# Patient Record
Sex: FEMALE | Race: WHITE
[De-identification: names, ages, dates, MRNs, and addresses within clinical notes are randomized per-mention and may not be internally consistent; named-entity substitution may affect disease eponyms.]

---

## 2017-09-21 ENCOUNTER — HOSPITAL ENCOUNTER (INPATIENT)
Dept: HOSPITAL 25 - ED | Age: 77
LOS: 11 days | Discharge: NURSING, REHAB OR LONG-TERM CARE | DRG: 183 | End: 2017-10-02
Attending: HOSPITALIST | Admitting: INTERNAL MEDICINE
Payer: MEDICARE

## 2017-09-21 DIAGNOSIS — J90: ICD-10-CM

## 2017-09-21 DIAGNOSIS — M10.9: ICD-10-CM

## 2017-09-21 DIAGNOSIS — M19.012: ICD-10-CM
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
## 2017-09-21 DIAGNOSIS — N17.9: ICD-10-CM

## 2017-09-21 DIAGNOSIS — Z88.1: ICD-10-CM

## 2017-09-21 DIAGNOSIS — D17.79: ICD-10-CM

## 2017-09-21 DIAGNOSIS — J18.9: ICD-10-CM

## 2017-09-21 DIAGNOSIS — Z79.4: ICD-10-CM

## 2017-09-21 DIAGNOSIS — Z99.81: ICD-10-CM

## 2017-09-21 DIAGNOSIS — J44.9: ICD-10-CM

## 2017-09-21 DIAGNOSIS — E66.01: ICD-10-CM

## 2017-09-21 DIAGNOSIS — S22.42XA: Primary | ICD-10-CM

## 2017-09-21 DIAGNOSIS — Z81.8: ICD-10-CM
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
## 2017-09-21 DIAGNOSIS — Z88.0: ICD-10-CM

## 2017-09-21 DIAGNOSIS — I12.9: ICD-10-CM

## 2017-09-21 DIAGNOSIS — F03.90: ICD-10-CM

## 2017-09-21 DIAGNOSIS — Z79.899: ICD-10-CM

## 2017-09-21 DIAGNOSIS — F05: ICD-10-CM

## 2017-09-21 DIAGNOSIS — Z91.048: ICD-10-CM

## 2017-09-21 DIAGNOSIS — E11.22: ICD-10-CM

## 2017-09-21 DIAGNOSIS — Z83.3: ICD-10-CM

## 2017-09-21 DIAGNOSIS — Z82.49: ICD-10-CM

## 2017-09-21 DIAGNOSIS — Z88.8: ICD-10-CM

## 2017-09-21 DIAGNOSIS — Z79.84: ICD-10-CM

## 2017-09-21 DIAGNOSIS — N18.3: ICD-10-CM

## 2017-09-21 DIAGNOSIS — E78.5: ICD-10-CM

## 2017-09-21 DIAGNOSIS — Z87.891: ICD-10-CM

## 2017-09-21 DIAGNOSIS — E87.5: ICD-10-CM

## 2017-09-21 LAB
ALBUMIN SERPL BCG-MCNC: 3.9 G/DL (ref 3.2–5.2)
ALP SERPL-CCNC: 72 U/L (ref 34–104)
ALT SERPL W P-5'-P-CCNC: 13 U/L (ref 7–52)
ANION GAP SERPL CALC-SCNC: 8 MMOL/L (ref 2–11)
AST SERPL-CCNC: 24 U/L (ref 13–39)
BUN SERPL-MCNC: 39 MG/DL (ref 6–24)
BUN/CREAT SERPL: 20.6 (ref 8–20)
CALCIUM SERPL-MCNC: 9.6 MG/DL (ref 8.6–10.3)
CHLORIDE SERPL-SCNC: 109 MMOL/L (ref 101–111)
GLOBULIN SER CALC-MCNC: 3.3 G/DL (ref 2–4)
GLUCOSE SERPL-MCNC: 164 MG/DL (ref 70–100)
HCO3 SERPL-SCNC: 20 MMOL/L (ref 22–32)
HCT VFR BLD AUTO: 36 % (ref 35–47)
HGB BLD-MCNC: 11.6 G/DL (ref 12–16)
MCH RBC QN AUTO: 33 PG (ref 27–31)
MCHC RBC AUTO-ENTMCNC: 32 G/DL (ref 31–36)
MCV RBC AUTO: 102 FL (ref 80–97)
POTASSIUM SERPL-SCNC: 5.2 MMOL/L (ref 3.5–5)
PROT SERPL-MCNC: 7.2 G/DL (ref 6.4–8.9)
RBC # BLD AUTO: 3.56 10^6/UL (ref 4–5.4)
SODIUM SERPL-SCNC: 137 MMOL/L (ref 133–145)
WBC # BLD AUTO: 11.8 10^3/UL (ref 3.5–10.8)

## 2017-09-21 PROCEDURE — 81003 URINALYSIS AUTO W/O SCOPE: CPT

## 2017-09-21 PROCEDURE — 71010: CPT

## 2017-09-21 PROCEDURE — 82570 ASSAY OF URINE CREATININE: CPT

## 2017-09-21 PROCEDURE — 74150 CT ABDOMEN W/O CONTRAST: CPT

## 2017-09-21 PROCEDURE — 80048 BASIC METABOLIC PNL TOTAL CA: CPT

## 2017-09-21 PROCEDURE — 85730 THROMBOPLASTIN TIME PARTIAL: CPT

## 2017-09-21 PROCEDURE — 80053 COMPREHEN METABOLIC PANEL: CPT

## 2017-09-21 PROCEDURE — 36415 COLL VENOUS BLD VENIPUNCTURE: CPT

## 2017-09-21 PROCEDURE — 84145 PROCALCITONIN (PCT): CPT

## 2017-09-21 PROCEDURE — 82803 BLOOD GASES ANY COMBINATION: CPT

## 2017-09-21 PROCEDURE — 93005 ELECTROCARDIOGRAM TRACING: CPT

## 2017-09-21 PROCEDURE — 84100 ASSAY OF PHOSPHORUS: CPT

## 2017-09-21 PROCEDURE — 81015 MICROSCOPIC EXAM OF URINE: CPT

## 2017-09-21 PROCEDURE — 85610 PROTHROMBIN TIME: CPT

## 2017-09-21 PROCEDURE — 85027 COMPLETE CBC AUTOMATED: CPT

## 2017-09-21 PROCEDURE — 85025 COMPLETE CBC W/AUTO DIFF WBC: CPT

## 2017-09-21 PROCEDURE — 71250 CT THORAX DX C-: CPT

## 2017-09-21 PROCEDURE — 84300 ASSAY OF URINE SODIUM: CPT

## 2017-09-21 PROCEDURE — 36600 WITHDRAWAL OF ARTERIAL BLOOD: CPT

## 2017-09-21 NOTE — RAD
INDICATION: Bruising and pain overlying the left flank after a fall from a standing height



COMPARISON: None.



TECHNIQUE: Multidetector CT images of the chest and abdomen were obtained from the lung

apices to the pelvic inlet without intravenous contrast   .



CHEST: 



The lungs exhibit mild bilateral groundglass opacification as well as centrilobular

emphysematous changes. There is no pneumothorax or large pleural effusion.



There is no mediastinal or hilar lymphadenopathy.



The heart and major vascular structures are grossly normal in appearance. There is coarse

atherosclerotic calcification of the coronary arteries and arch of the aorta.



There is nondisplaced fracture at the posterior left 12th rib (image 63), the posterior

left eighth rib (image 34), the posterior left seventh rib (image 27), the posterior

lateral left sixth rib (image 24) and the posterior lateral left fifth rib (image 19). The

sixth rib fracture exhibits the greatest degree of displacement.



Depicted best on the sagittal plane images (61 through 63) there is cortical irregularity

at the tip of the scapula but the margins seem adequately corticated giving it more of a

chronic appearance. There is no focal inflammatory change surrounding this appearance.



ABDOMEN \T\ PELVIS: 



The liver, spleen, pancreas and left adrenal gland are grossly normal in appearance. The

gallbladder is normal.



At the medial limb of the left adrenal gland (image 52) there is an 11 mm fat density

nodule.



The kidneys are normal in appearance without focal mass, calcification or signs of

hydronephrosis.



The visualized portions of the small and large bowel are not distended. Distal colonic

diverticula are noted, , none with focal inflammatory change.



There is coarse atherosclerotic calcification of the abdominal aorta extending to the

iliac bifurcation.



Multifocal degenerative changes of the thoracic and upper lumbar spine include loss of

intervertebral disc height and marginal osteophyte formation and lower thoracic and lumbar

level vacuum disc phenomenon. No definite vertebral body fracture is identified.



IMPRESSION: 



1. Multiple posterior lateral left rib fractures as described above with only the 6th rib

fracture exhibiting any significant displacement. There is no left-sided pneumothorax or

large pleural effusion. 

2. Cortical irregularity at the tip of the left scapula may been a fracture as well, but

the margins indicate a more chronic process.

3. Likely left adrenal lipoma measuring 11 mm in greatest dimension. There are no prior CT

images to comment on chronicity. This finding can either be followed up or further

characterized with multiphase CT or MRI of the abdomen according to an adrenal gland

protocol. Clinical significance is doubtful. 

4. Mild diffuse groundglass opacification and thickening of the interlobular septa could

be due to chronic congestive heart failure or early interstitial lung disease.

5. There are additional chronic and degenerative changes described in the body the report

unlikely to BE related to the patient's acute presentation.

## 2017-09-21 NOTE — RAD
HISTORY: Pain after fall



COMPARISONS: None



VIEWS: 6,  Frontal view of the chest with frontal and oblique views of the left hemithorax



FINDINGS: There is no displaced rib fracture or pneumothorax. The cardiac silhouette is

mildly enlarged. There is patchy linear opacification lung bases bilaterally.    



IMPRESSION: 

NO DISPLACED RIB FRACTURE OR PNEUMOTHORAX.

CLINICALLY.

BIBASILAR ATELECTASIS VERSUS CONSOLIDATION.

## 2017-09-21 NOTE — RAD
Indication: LEFT shoulder pain post fall.



Comparison: November 08, 2013 chest radiograph.



Technique: Internal rotation AP, external rotation Grashey, scapular Y, axillary views

LEFT shoulder



Report: Normal acromioclavicular and glenohumeral joint alignment. Negative for fracture.

Moderate osteophytosis and capsular hypertrophy at the acromial clavicular joint with

suggestion of small loose bodies at the AC joint versus extensive chondrocalcinosis and

dystrophic calcification of the joint capsule. Mild osteophytic lipping at the

glenohumeral joint and moderate joint space narrowing. Mild burden of calcific

tendinopathy at the supraspinatus and infraspinatus. Unremarkable soft tissue contours.



IMPRESSION: 

1. Advanced osteoarthritis at the acromioclavicular joint with progression.

2. Less marked osteoarthritis at the glenohumeral joint.

3. Stigmata of calcific tendinopathy.

4. Negative for fracture.

## 2017-09-22 LAB
ADD DIFF/SLIDE REVIEW?: (no result)
ANION GAP SERPL CALC-SCNC: 6 MMOL/L (ref 2–11)
BUN SERPL-MCNC: 40 MG/DL (ref 6–24)
BUN/CREAT SERPL: 21.9 (ref 8–20)
CALCIUM SERPL-MCNC: 9.1 MG/DL (ref 8.6–10.3)
CHLORIDE SERPL-SCNC: 111 MMOL/L (ref 101–111)
GLUCOSE SERPL-MCNC: 132 MG/DL (ref 70–100)
HCO3 SERPL-SCNC: 21 MMOL/L (ref 22–32)
HCT VFR BLD AUTO: 33 % (ref 35–47)
HGB BLD-MCNC: 10.7 G/DL (ref 12–16)
MCH RBC QN AUTO: 33 PG (ref 27–31)
MCHC RBC AUTO-ENTMCNC: 32 G/DL (ref 31–36)
MCV RBC AUTO: 102 FL (ref 80–97)
POTASSIUM SERPL-SCNC: 5.3 MMOL/L (ref 3.5–5)
RBC # BLD AUTO: 3.26 10^6/UL (ref 4–5.4)
SODIUM SERPL-SCNC: 138 MMOL/L (ref 133–145)
WBC # BLD AUTO: 11.3 10^3/UL (ref 3.5–10.8)

## 2017-09-22 RX ADMIN — HEPARIN SODIUM SCH UNITS: 5000 INJECTION INTRAVENOUS; SUBCUTANEOUS at 06:16

## 2017-09-22 RX ADMIN — HEPARIN SODIUM SCH UNITS: 5000 INJECTION INTRAVENOUS; SUBCUTANEOUS at 13:25

## 2017-09-22 RX ADMIN — MORPHINE SULFATE PRN MG: 10 INJECTION, SOLUTION INTRAMUSCULAR; INTRAVENOUS at 17:39

## 2017-09-22 RX ADMIN — HEPARIN SODIUM SCH UNITS: 5000 INJECTION INTRAVENOUS; SUBCUTANEOUS at 21:44

## 2017-09-22 RX ADMIN — INSULIN LISPRO SCH: 100 INJECTION, SOLUTION INTRAVENOUS; SUBCUTANEOUS at 16:53

## 2017-09-22 RX ADMIN — INSULIN LISPRO SCH UNITS: 100 INJECTION, SOLUTION INTRAVENOUS; SUBCUTANEOUS at 08:22

## 2017-09-22 RX ADMIN — DOCUSATE SODIUM SCH MG: 100 CAPSULE, LIQUID FILLED ORAL at 08:25

## 2017-09-22 RX ADMIN — SENNOSIDES SCH TAB: 8.6 TABLET, FILM COATED ORAL at 08:25

## 2017-09-22 RX ADMIN — ATENOLOL SCH MG: 50 TABLET ORAL at 08:25

## 2017-09-22 RX ADMIN — MORPHINE SULFATE PRN MG: 10 INJECTION, SOLUTION INTRAMUSCULAR; INTRAVENOUS at 21:45

## 2017-09-22 RX ADMIN — INSULIN LISPRO SCH: 100 INJECTION, SOLUTION INTRAVENOUS; SUBCUTANEOUS at 11:42

## 2017-09-22 RX ADMIN — GLIPIZIDE SCH MG: 5 TABLET ORAL at 21:44

## 2017-09-22 RX ADMIN — OXYCODONE HYDROCHLORIDE PRN MG: 5 CAPSULE ORAL at 02:28

## 2017-09-22 RX ADMIN — GABAPENTIN SCH MG: 300 CAPSULE ORAL at 08:24

## 2017-09-22 RX ADMIN — LIDOCAINE SCH PATCH: 50 PATCH CUTANEOUS at 02:25

## 2017-09-22 RX ADMIN — LEVOTHYROXINE SODIUM SCH MCG: 75 TABLET ORAL at 06:12

## 2017-09-22 RX ADMIN — SENNOSIDES SCH TAB: 8.6 TABLET, FILM COATED ORAL at 21:44

## 2017-09-22 RX ADMIN — AMLODIPINE BESYLATE SCH MG: 5 TABLET ORAL at 08:24

## 2017-09-22 RX ADMIN — GABAPENTIN SCH MG: 300 CAPSULE ORAL at 13:24

## 2017-09-22 RX ADMIN — GABAPENTIN SCH MG: 300 CAPSULE ORAL at 21:44

## 2017-09-22 RX ADMIN — INSULIN LISPRO SCH: 100 INJECTION, SOLUTION INTRAVENOUS; SUBCUTANEOUS at 21:15

## 2017-09-22 RX ADMIN — INSULIN GLARGINE SCH UNITS: 100 INJECTION, SOLUTION SUBCUTANEOUS at 08:23

## 2017-09-22 RX ADMIN — DOCUSATE SODIUM SCH MG: 100 CAPSULE, LIQUID FILLED ORAL at 21:43

## 2017-09-22 RX ADMIN — GLIPIZIDE SCH MG: 5 TABLET ORAL at 08:25

## 2017-09-22 RX ADMIN — WATER SCH NOTE: 100 INJECTION, SOLUTION INTRAVENOUS at 13:30

## 2017-09-22 NOTE — PN
Subjective


Date of Service: 09/22/17


Interval History: 





Patient seen this morning. Reports continued rib pain with movement and very 

deep breathing, not too bothersome when at rest. Reports that current dose of 

morphine does not touch the pain.


Family History: Unchanged from Admission


Social History: Unchanged from Admission


Past Medical History: Unchanged from Admission





Objective


Active Medications: 








Acetaminophen (Tylenol Tab*)  650 mg PO Q4H PRN


Amlodipine Besylate (Norvasc Tab*)  2.5 mg PO DAILY [facility]


Atenolol (Tenormin Tab*)  50 mg PO DAILY [facility]


Dextrose (D50w Syringe 50 Ml*)  12.5 gm IV PUSH .FOR FS < 60 - SS PRN


Docusate Sodium (Colace Cap*)  100 mg PO BID [facility]


Gabapentin (Neurontin Cap(*))  600 mg PO TID [facility]


Glipizide (Glucotrol Tab*)  10 mg PO BID [facility]


Heparin Sodium (Porcine) (Heparin Vial(*))  5,000 units SUBCUT Q8HR [facility]


Insulin Glargine (Lantus(*))  10 units SUBCUT Q24H [facility]


Insulin Human Lispro (Humalog*)  0 units SUBCUT ACHS [facility]


Levothyroxine Sodium (Synthroid Tab*)  75 mcg PO 0600 [facility]


Lidocaine (Lidoderm 5% Patch*)  1 patch TRANSDERM 0900 [facility]


Morphine Sulfate (Morphine Inj (Syringe)*)  6 mg IV Q4H PRN


Oxycodone HCl (Roxycodone Tab*)  10 mg PO Q4H PRN


Pharmacy Profile Note (Lidocaine Patch Remove*)  1 note PATCH OFF 2100 [facility]


Senna (Senokot Tab*)  1 tab PO BID [facility]





 Vital Signs











  09/22/17 09/22/17 09/22/17





  00:00 00:01 00:30


 


Temperature   


 


Pulse Rate 56 56 50


 


Respiratory   





Rate   


 


Blood Pressure  158/60 80/58





(mmHg)   


 


O2 Sat by Pulse 87 88 94





Oximetry   














  09/22/17 09/22/17 09/22/17





  00:40 00:43 00:50


 


Temperature 97.9 F  


 


Pulse Rate 58 55 


 


Respiratory 16  18





Rate   


 


Blood Pressure 154/59 154/59 





(mmHg)   


 


O2 Sat by Pulse 96 92 





Oximetry   














  09/22/17 09/22/17 09/22/17





  02:00 02:28 03:47


 


Temperature 97.7 F  97.8 F


 


Pulse Rate 57  51


 


Respiratory 18 22 20





Rate   


 


Blood Pressure 161/40  136/41





(mmHg)   


 


O2 Sat by Pulse 97  95





Oximetry   











Oxygen Devices in Use Now: Nasal Cannula


Appearance: Elderly, obese,  F, laying in bed in NAD


Eyes: No Scleral Icterus


Ears/Nose/Mouth/Throat: Mucous Membranes Moist


Neck: NL Appearance and Movements; NL JVP


Respiratory: Symmetrical Chest Expansion and Respiratory Effort, - - Diminished 

in bases


Cardiovascular: NL Sounds; No Murmurs; No JVD, RRR


Abdominal: NL Sounds; No Tenderness; No Distention


Lymphatic: No Cervical Adenopathy


Extremities: No Edema


Skin: - - Ecchymosis along L later chest wall and back


Neurological: Alert and Oriented x 3


Result Diagrams: 


 09/22/17 05:54





 09/22/17 05:54





Assess/Plan/Problems-Billing


Assessment: 


Mechanical fall and subsequent rib fxs in a 77 yo F with hx of HTN, HLD, obesity

, O2 dependent COPD, DM








- Patient Problems


(1) Rib fractures


Current Visit: Yes   Comment: Pain control. Will increase prn morphine. 

Incentive spirometer. PT/OT   





(2) Diabetes


Current Visit: Yes   Comment: Continue HISS and Lantus. Holding home metformin. 

Continue Glipizide.    





(3) HTN (hypertension)


Current Visit: Yes   Comment: Continue Atenolol. Holding home Lisinopril.    





(4) O2 dependent


Current Visit: Yes   Comment: Patient denies COPD but is on home O2. Will need 

med rec.   





(5) DVT prophylaxis


Current Visit: Yes   Comment: HSQ   


Status and Disposition: 





Inpatient for continued pain control. ?need for MARILY

## 2017-09-22 NOTE — HP
CC:  Dr. Motta *

 

HISTORY AND PHYSICAL:

 

DATE OF ADMISSION:  09/21/17

 

PRIMARY CARE PHYSICIAN:  Dr. Motta

 

CHIEF COMPLAINT:  Status post fall and left chest pain.

 

HISTORY OF PRESENT ILLNESS:  The patient is a 76-year-old female with history 
of diabetes, hypertension, oxygen-dependent COPD who was picking up her pants 
today while in the bathroom and she tripped when she did that, fell and her 
left upper chest and left upper back hit the toilet seat.  She had extreme pain 
afterwards and she came into ED for evaluation.  Here a CT of the chest, 
abdomen and pelvis showed 5 broken ribs on the left side.  She is going to be 
admitted for intractable pain and left-sided rib fractures.

 

PAST MEDICAL HISTORY:

1.  History of degenerative disk disease.

2.  History of diabetes type 2.

3.  Hypertension.

4.  Hyperlipidemia.

5.  Obesity.

6.  COPD, oxygen dependent.

7.  History of gout.

8.  History of right leg ORIF.

9.  Chronic kidney disease stage 3 due to diabetes.

 

MEDICATIONS:  Include:

1.  Ultram 50 mg 3 times a day as needed.

2.  Lantus insulin 42 units subcutaneously b.i.d.

3.  Simvastatin 20 mg a day.

4.  Neurontin 600 mg 3 times a day.

5.  Levothyroxine 75 mcg daily.

6.  Glipizide 10 mg b.i.d.

7.  Potassium chloride 10 mEq daily.

8.  Metformin 1000 mg b.i.d.

9.  Atenolol with chlorthalidone 50/25 one tablet a day.

10.  Amlodipine 2.5 mg daily.

 

ALLERGIES:  Multiple and include CEPHALEXIN, CI PIGMENT BLUE 63 from CYMBALTA, 
DULOXETINE, PENICILLINS, BACTRIM, and GANTRISIN.

 

FAMILY HISTORY:  Positive for mother with history of dementia and father with 
history of diabetes, heart disease, and polio.

 

SOCIAL HISTORY:  The patient had quit smoking in 1999.  She denies any alcohol 
or drug use.  She lives with her daughter who is a nurse and her .  As a 
surrogate, she mentions both her  and her daughter.  Her 's name 
is Stew Muir and daughter is Yue Hdez.

 

REVIEW OF SYSTEMS:  Please see history of present illness.  Patient had been in 
her usual state of health until the fall.  After her fall, she noted that 
severe left chest and left upper back pain.  She denies losing consciousness 
with the fall. She stated that it was purely mechanical.  She also denies 
losing consciousness after the fall.

 

All the remaining 14 systems were reviewed with the patient and were otherwise 
negative.

 

                               PHYSICAL EXAMINATION

 

GENERAL:  The patient is a pleasant 76-year-old female, whose BMI is 43.  The 
patient is in no acute distress.  Alert, awake, and oriented x3.

 

VITAL SIGNS:  Blood pressure of 141/96, heart rate of 56 and regular, 
respiratory rate 16, and oxygen saturation 90% on 2 L of oxygen nasal cannula, 
temperature of 98.2.

 

HEENT:  Head atraumatic and normocephalic.  Eyes:  Pupils are equal and 
reactive to light and accommodation.  Oropharynx clear, mucosa moist.

 

NECK:  Supple.  No JVD.  No bruits bilaterally.

 

RESPIRATORY:  Coarse crackles at bilateral bases, otherwise clear.

 

CARDIOVASCULAR:  Regular rate and rhythm.  No murmur.

 

ABDOMEN:  Soft and nontender.  Bowel sounds are present in all 4 quadrants.

 

EXTREMITIES:  There is a trace bilateral ankle edema.  Pulses +2 bilaterally.  
No clubbing or cyanosis.

 

NEUROLOGIC:  Speech clear.  Cranial nerves II through XII are grossly intact. 
Motor strength is 5/5 bilaterally.

 

PSYCHIATRIC:  The patient is pleasant, cooperative with evaluation with no 
evidence of anxiety or depression.

 

SKIN:  The patient has a rather large area of ecchymosis overlying the left 
chest and left upper back.

 

 DIAGNOSTIC STUDIES/LAB DATA:  Sodium of 137, potassium of 5.2, chloride 109, 
carbon dioxide 20, BUN 39, creatinine 1.89. Liver function tests unremarkable.

 

White blood cell count of 11.8, hemoglobin of 11.6, hematocrit of 36, and MCV 
of 102, and platelets of 212.

 

CT of chest and abdomen - impression: "Multiple posterolateral rib fractures as 
described above with only the 6th rib fracture exhibiting any significant 
displacement. There is no left-sided pneumothorax or pleural effusion.  
Cortical irregularity at the tip of the left scapula may be a fracture as well, 
but the margins indicate a more chronic process.  Likely left adrenal lipoma 
measuring 11 mm in the greatest dimension.  There are no prior CT images to 
comment on chronicity.  This finding is either to be followed up with further 
images of CT or MRI of the abdomen according to adrenal gland protocol.  
Clinical significance is doubtful.  Mild diffuse ground-glass opacification and 
thickening of the interlobular septa could be due to chronic congestive heart 
failure or an interstitial lung disease.  There are additional chronic and 
degenerative changes as described in the body of the report, likely to be 
related to the patient's acute presentation."

 

Shoulder x-ray on the left - impression:  "Advanced osteoarthritis of 
acromioclavicular joint with progression.  Less marked osteoarthritis of the 
glenohumeral joint.  Stigmata of calcific tendinopathy.  Negative for fracture.
"
 

ASSESSMENT AND PLAN:

1.  Patient has a chest trauma after a fall.  She suffered from 5 rib fractures
, out of them only one is displaced.  So far she is going to be admitted for 
pain control.  There is no noted pneumothorax and no other trauma.  Dilaudid 
made her "woozy and weak."  I am going to continue the patient on morphine and 
oxycodone. She may require a nerve block in the future if she continues to have 
problems with pain control.

2.  In regards to the patient's diabetes, her glipizide is going to be 
continued. Metformin is going to be held.  The patient is going to be placed on 
10 units of Lantus and insulin sliding scale.

3.  In regards to the patient's hypertension, I will continue atenolol for the 
time being and observe the patient's blood pressure.

4.  Patient's hyperkalemia is mild.  I will hold the patient's lisinopril for 
the time being.

5.  In regards to patient's chronic obstructive pulmonary disease, it is not an 
exacerbation.  She is going to be continued on Advair and oxygen 
supplementation.

6.  For DVT prophylaxis.  The patient is going to be placed on heparin 
subcutaneously.

7.  Due to her recent fall, Physical Therapy and Occupational Therapy will see 
patient in evaluation.

8.  The patient's code status is full.

 

TIME SPENT:  Please note that approximately 62 minutes was spent on the patient'
s admission, more than half that time was spent face-to-face with the patient 
during the interview and physical exam.

 

[id number]/CPS #: [id number]

JOSLYN

## 2017-09-22 NOTE — ED
I, Krishan Swartz, scribed for Rohan Charles MD on 09/21/17 at 2322 .





Progress





- Progress Note


Progress Note: 





77yo female is here for left rib pain s/p mechanical fall. Signout pt from Dr. Fry. Pending CT report.





- Results/Orders


Results/Orders: 





CT Chest/Abdomen W/O





IMPRESSION: 





1. Multiple posterior lateral left rib fractures as described above with only 

the 6th rib


fracture exhibiting any significant displacement. There is no left-sided 

pneumothorax or


large pleural effusion. 


2. Cortical irregularity at the tip of the left scapula may been a fracture as 

well, but


the margins indicate a more chronic process.


3. Likely left adrenal lipoma measuring 11 mm in greatest dimension. There are 

no prior CT


images to comment on chronicity. This finding can either be followed up or 

further


characterized with multiphase CT or MRI of the abdomen according to an adrenal 

gland


protocol. Clinical significance is doubtful. 


4. Mild diffuse groundglass opacification and thickening of the interlobular 

septa could


be due to chronic congestive heart failure or early interstitial lung disease.


5. There are additional chronic and degenerative changes described in the body 

the report


unlikely to BE related to the patient's acute presentation.





ED physician has reviewed the radiology report and agrees with the finding.





Re-Evaluation





- Re-Evaluation


  ** First Eval


Re-Evaluation Time: 23:09


Comment: Reviewed CT report with the pt.





Course/Dx





- Course


Course Of Treatment: Discussed with Dr. Leal (hospitalist) at 2313 hour. Dr. Leal will evaluate the pt.





- Diagnoses


Provider Diagnoses: 


 Left rib fracture, possible scapular fracture








The documentation as recorded by the Maxime lewis Benjamin accurately reflects 

the service I personally performed and the decisions made by me, Rohan Charles MD.

## 2017-09-23 LAB
ANION GAP SERPL CALC-SCNC: 7 MMOL/L (ref 2–11)
ANION GAP SERPL CALC-SCNC: 8 MMOL/L (ref 2–11)
BUN SERPL-MCNC: 51 MG/DL (ref 6–24)
BUN SERPL-MCNC: 53 MG/DL (ref 6–24)
BUN/CREAT SERPL: 18.2 (ref 8–20)
BUN/CREAT SERPL: 18.7 (ref 8–20)
CALCIUM SERPL-MCNC: 9 MG/DL (ref 8.6–10.3)
CALCIUM SERPL-MCNC: 9 MG/DL (ref 8.6–10.3)
CHLORIDE SERPL-SCNC: 108 MMOL/L (ref 101–111)
CHLORIDE SERPL-SCNC: 109 MMOL/L (ref 101–111)
GLUCOSE SERPL-MCNC: 103 MG/DL (ref 70–100)
GLUCOSE SERPL-MCNC: 81 MG/DL (ref 70–100)
HCO3 SERPL-SCNC: 18 MMOL/L (ref 22–32)
HCO3 SERPL-SCNC: 20 MMOL/L (ref 22–32)
O2/TOTAL GAS SETTING VFR VENT: 2 %
POTASSIUM SERPL-SCNC: 5.8 MMOL/L (ref 3.5–5)
POTASSIUM SERPL-SCNC: 6.1 MMOL/L (ref 3.5–5)
SODIUM SERPL-SCNC: 135 MMOL/L (ref 133–145)
SODIUM SERPL-SCNC: 135 MMOL/L (ref 133–145)

## 2017-09-23 RX ADMIN — GLIPIZIDE SCH MG: 5 TABLET ORAL at 09:32

## 2017-09-23 RX ADMIN — INSULIN LISPRO SCH: 100 INJECTION, SOLUTION INTRAVENOUS; SUBCUTANEOUS at 08:43

## 2017-09-23 RX ADMIN — HEPARIN SODIUM SCH UNITS: 5000 INJECTION INTRAVENOUS; SUBCUTANEOUS at 21:39

## 2017-09-23 RX ADMIN — OXYCODONE HYDROCHLORIDE PRN MG: 5 CAPSULE ORAL at 09:33

## 2017-09-23 RX ADMIN — ATENOLOL SCH MG: 50 TABLET ORAL at 09:31

## 2017-09-23 RX ADMIN — ACETAMINOPHEN SCH MG: 325 TABLET ORAL at 13:53

## 2017-09-23 RX ADMIN — HEPARIN SODIUM SCH UNITS: 5000 INJECTION INTRAVENOUS; SUBCUTANEOUS at 05:52

## 2017-09-23 RX ADMIN — OXYCODONE HYDROCHLORIDE PRN MG: 5 CAPSULE ORAL at 04:42

## 2017-09-23 RX ADMIN — ALLOPURINOL SCH MG: 100 TABLET ORAL at 09:32

## 2017-09-23 RX ADMIN — SENNOSIDES SCH TAB: 8.6 TABLET, FILM COATED ORAL at 09:32

## 2017-09-23 RX ADMIN — SENNOSIDES SCH TAB: 8.6 TABLET, FILM COATED ORAL at 21:40

## 2017-09-23 RX ADMIN — CALCITRIOL SCH MCG: 0.25 CAPSULE ORAL at 09:40

## 2017-09-23 RX ADMIN — ACETAMINOPHEN SCH MG: 325 TABLET ORAL at 21:39

## 2017-09-23 RX ADMIN — HEPARIN SODIUM SCH UNITS: 5000 INJECTION INTRAVENOUS; SUBCUTANEOUS at 13:53

## 2017-09-23 RX ADMIN — INSULIN LISPRO SCH: 100 INJECTION, SOLUTION INTRAVENOUS; SUBCUTANEOUS at 12:53

## 2017-09-23 RX ADMIN — ATORVASTATIN CALCIUM SCH MG: 10 TABLET, FILM COATED ORAL at 09:32

## 2017-09-23 RX ADMIN — MORPHINE SULFATE PRN MG: 10 INJECTION, SOLUTION INTRAMUSCULAR; INTRAVENOUS at 07:45

## 2017-09-23 RX ADMIN — INSULIN LISPRO SCH: 100 INJECTION, SOLUTION INTRAVENOUS; SUBCUTANEOUS at 22:19

## 2017-09-23 RX ADMIN — DOCUSATE SODIUM SCH MG: 100 CAPSULE, LIQUID FILLED ORAL at 21:40

## 2017-09-23 RX ADMIN — WATER SCH NOTE: 100 INJECTION, SOLUTION INTRAVENOUS at 22:20

## 2017-09-23 RX ADMIN — DOCUSATE SODIUM SCH MG: 100 CAPSULE, LIQUID FILLED ORAL at 09:33

## 2017-09-23 RX ADMIN — INSULIN GLARGINE SCH UNITS: 100 INJECTION, SOLUTION SUBCUTANEOUS at 09:31

## 2017-09-23 RX ADMIN — AMLODIPINE BESYLATE SCH MG: 5 TABLET ORAL at 09:33

## 2017-09-23 RX ADMIN — LEVOTHYROXINE SODIUM SCH MCG: 75 TABLET ORAL at 05:52

## 2017-09-23 RX ADMIN — LIDOCAINE SCH PATCH: 50 PATCH CUTANEOUS at 09:23

## 2017-09-23 RX ADMIN — INSULIN LISPRO SCH: 100 INJECTION, SOLUTION INTRAVENOUS; SUBCUTANEOUS at 18:23

## 2017-09-23 RX ADMIN — GABAPENTIN SCH MG: 300 CAPSULE ORAL at 09:32

## 2017-09-23 NOTE — PN
Hospitalist Progress Note





Patient seen multiple times throughout the day with daughter present. In the 

afternoon she became less lethargic although remains somewhat confused and 

delirious. Suspect this is due to renal failure and poor clearance of morphine 

and gabapentin. ABG did not show respiratory acidosis, held on giving narcan as 

patient became more alert. For now will hold narcotics, if she becomes more 

coherent and has significant pain, can consider restarting her home Tramadol to 

begin with.


K remained elevated on PM check, gave Kayexalate.

## 2017-09-23 NOTE — PN
Subjective


Date of Service: 09/23/17


Interval History: 





Patient seen this morning. Nursing reported she had little pain control 

overnight and she received dose of morphine within the past two hours. On exam 

patient is very lethargic, will awaken to questions but quickly fall back 

asleep. While awake says she is in 10/10 pain but then dozes off again. 


Family History: Unchanged from Admission


Social History: Unchanged from Admission


Past Medical History: Unchanged from Admission





Objective


Active Medications: 








Acetaminophen (Tylenol Tab*)  975 mg PO TID [facility]


Allopurinol (Zyloprim Tab*)  100 mg PO DAILY [facility]


Amlodipine Besylate (Norvasc Tab*)  2.5 mg PO DAILY [facility]


   Last Admin: 09/22/17 08:24 Dose:  2.5 mg


Atenolol (Tenormin Tab*)  50 mg PO DAILY [facility]


   Last Admin: 09/22/17 08:25 Dose:  50 mg


Atorvastatin Calcium (Lipitor*)  10 mg PO DAILY [facility]


Calcitriol (Rocaltrol  Cap*)  0.25 mcg PO DAILY [facility]


Dextrose (D50w Syringe 50 Ml*)  12.5 gm IV PUSH .FOR FS < 60 - SS PRN


   PRN Reason: FS < 60


Docusate Sodium (Colace Cap*)  100 mg PO BID [facility]


   Last Admin: 09/22/17 21:43 Dose:  100 mg


Gabapentin (Neurontin Cap(*))  600 mg PO TID [facility]


   Last Admin: 09/22/17 21:44 Dose:  600 mg


Glipizide (Glucotrol Tab*)  10 mg PO BID [facility]


   Last Admin: 09/22/17 21:44 Dose:  10 mg


Heparin Sodium (Porcine) (Heparin Vial(*))  5,000 units SUBCUT Q8HR [facility]


   Last Admin: 09/23/17 05:52 Dose:  5,000 units


Insulin Glargine (Lantus(*))  10 units SUBCUT Q24H [facility]


   Last Admin: 09/22/17 08:23 Dose:  10 units


Insulin Human Lispro (Humalog*)  0 units SUBCUT ACHS [facility]


   PRN Reason: Protocol


   Last Admin: 09/23/17 08:43 Dose:  Not Given


Levothyroxine Sodium (Synthroid Tab*)  75 mcg PO 0600 [facility]


   Last Admin: 09/23/17 05:52 Dose:  75 mcg


Lidocaine (Lidoderm 5% Patch*)  1 patch TRANSDERM 0900 [facility]


   Last Admin: 09/22/17 02:25 Dose:  1 patch


Morphine Sulfate (Morphine Inj (Syringe)*)  6 mg IV Q4H PRN


   PRN Reason: PAIN


   Last Admin: 09/23/17 07:45 Dose:  6 mg


Oxycodone HCl (Roxycodone Tab*)  10 mg PO Q4H PRN


   PRN Reason: PAIN


   Last Admin: 09/23/17 04:42 Dose:  10 mg


Pharmacy Profile Note (Lidocaine Patch Remove*)  1 note PATCH OFF 2100 [facility]


   Last Admin: 09/22/17 13:30 Dose:  1 note


Senna (Senokot Tab*)  1 tab PO BID [facility]


   Last Admin: 09/22/17 21:44 Dose:  1 tab








 Vital Signs











  09/22/17 09/22/17 09/22/17





  09:26 09:53 10:24


 


Temperature  97.4 F 


 


Pulse Rate  46 


 


Respiratory 20 20 18





Rate   


 


Blood Pressure  145/51 





(mmHg)   


 


O2 Sat by Pulse  97 





Oximetry   














  09/22/17 09/22/17 09/22/17





  11:46 13:24 15:24


 


Temperature 97.6 F  


 


Pulse Rate 46  


 


Respiratory 16 18 18





Rate   


 


Blood Pressure 133/76  





(mmHg)   


 


O2 Sat by Pulse 92  





Oximetry   














  09/23/17 09/23/17





  06:51 07:45


 


Temperature 98.0 F 


 


Pulse Rate 47 


 


Respiratory 28 20





Rate  


 


Blood Pressure 151/46 





(mmHg)  


 


O2 Sat by Pulse 90 





Oximetry  











Oxygen Devices in Use Now: Nasal Cannula - 3L


Appearance: Elderly,  F, laying in bed, lethargic


Eyes: No Scleral Icterus


Ears/Nose/Mouth/Throat: - - Dry MM


Neck: NL Appearance and Movements; NL JVP


Respiratory: Symmetrical Chest Expansion and Respiratory Effort, - - Diminished 

in bases


Cardiovascular: NL Sounds; No Murmurs; No JVD, RRR


Abdominal: NL Sounds; No Tenderness; No Distention


Lymphatic: No Cervical Adenopathy


Extremities: No Edema


Skin: No Rash or Ulcers


Neurological: - - Lethargic, oriented, no focal deficits


Result Diagrams: 


 09/22/17 05:54





 09/23/17 14:09





Assess/Plan/Problems-Billing


Assessment: 


Mechanical fall and subsequent rib fxs in a 77 yo F with hx of HTN, HLD, obesity

, O2 dependent COPD, DM








- Patient Problems


(1) Rib fractures


Current Visit: Yes   Comment: Pain control. Will hold on any increases for now 

and monitor mental status prior to additional narcotics. Stop gabapentin. Will 

add ATC tylenol. Continue Lidoderm patch. Incentive spirometer. PT/OT   





(2) Diabetes


Current Visit: Yes   Comment: Continue HISS and Lantus. Holding home metformin. 

Continue Glipizide.    





(3) MARISSA (acute kidney injury)


Current Visit: Yes   Comment: on CKD. Creatinine bumped today, ?pre-renal, poor 

PO intake. Will give 1L IVF. Check ulytes, UA. Stop gabapentin.   





(4) HTN (hypertension)


Current Visit: Yes   Comment: Continue Atenolol. Holding home Lisinopril.    





(5) O2 dependent


Current Visit: Yes   Comment: Patient denies COPD but is on home O2.   





(6) DVT prophylaxis


Current Visit: Yes   Comment: HSQ   


Status and Disposition: 





Inpatient for continued pain control. ?need for MARILY

## 2017-09-24 LAB
ANION GAP SERPL CALC-SCNC: 7 MMOL/L (ref 2–11)
ANION GAP SERPL CALC-SCNC: 8 MMOL/L (ref 2–11)
BUN SERPL-MCNC: 61 MG/DL (ref 6–24)
BUN SERPL-MCNC: 64 MG/DL (ref 6–24)
BUN/CREAT SERPL: 18.3 (ref 8–20)
BUN/CREAT SERPL: 19.8 (ref 8–20)
CALCIUM SERPL-MCNC: 8.6 MG/DL (ref 8.6–10.3)
CALCIUM SERPL-MCNC: 8.7 MG/DL (ref 8.6–10.3)
CHLORIDE SERPL-SCNC: 108 MMOL/L (ref 101–111)
CHLORIDE SERPL-SCNC: 108 MMOL/L (ref 101–111)
GLUCOSE SERPL-MCNC: 53 MG/DL (ref 70–100)
GLUCOSE SERPL-MCNC: 65 MG/DL (ref 70–100)
HCO3 SERPL-SCNC: 19 MMOL/L (ref 22–32)
HCO3 SERPL-SCNC: 21 MMOL/L (ref 22–32)
HCT VFR BLD AUTO: 31 % (ref 35–47)
HGB BLD-MCNC: 9.9 G/DL (ref 12–16)
MCH RBC QN AUTO: 33 PG (ref 27–31)
MCHC RBC AUTO-ENTMCNC: 32 G/DL (ref 31–36)
MCV RBC AUTO: 103 FL (ref 80–97)
POTASSIUM SERPL-SCNC: 5.2 MMOL/L (ref 3.5–5)
POTASSIUM SERPL-SCNC: 5.3 MMOL/L (ref 3.5–5)
RBC # BLD AUTO: 2.99 10^6/UL (ref 4–5.4)
SODIUM SERPL-SCNC: 135 MMOL/L (ref 133–145)
SODIUM SERPL-SCNC: 136 MMOL/L (ref 133–145)
SODIUM UR-SCNC: 21 MMOL/L
WBC # BLD AUTO: 13.3 10^3/UL (ref 3.5–10.8)

## 2017-09-24 RX ADMIN — INSULIN LISPRO SCH: 100 INJECTION, SOLUTION INTRAVENOUS; SUBCUTANEOUS at 12:02

## 2017-09-24 RX ADMIN — ALLOPURINOL SCH MG: 100 TABLET ORAL at 09:50

## 2017-09-24 RX ADMIN — HEPARIN SODIUM SCH UNITS: 5000 INJECTION INTRAVENOUS; SUBCUTANEOUS at 05:40

## 2017-09-24 RX ADMIN — DOCUSATE SODIUM SCH MG: 100 CAPSULE, LIQUID FILLED ORAL at 20:51

## 2017-09-24 RX ADMIN — TRAMADOL HYDROCHLORIDE PRN MG: 50 TABLET, FILM COATED ORAL at 20:51

## 2017-09-24 RX ADMIN — HEPARIN SODIUM SCH UNITS: 5000 INJECTION INTRAVENOUS; SUBCUTANEOUS at 12:55

## 2017-09-24 RX ADMIN — ACETAMINOPHEN SCH MG: 325 TABLET ORAL at 20:51

## 2017-09-24 RX ADMIN — INSULIN LISPRO SCH: 100 INJECTION, SOLUTION INTRAVENOUS; SUBCUTANEOUS at 08:18

## 2017-09-24 RX ADMIN — DOCUSATE SODIUM SCH MG: 100 CAPSULE, LIQUID FILLED ORAL at 09:50

## 2017-09-24 RX ADMIN — TRAMADOL HYDROCHLORIDE PRN MG: 50 TABLET, FILM COATED ORAL at 11:27

## 2017-09-24 RX ADMIN — CALCITRIOL SCH MCG: 0.25 CAPSULE ORAL at 09:47

## 2017-09-24 RX ADMIN — SENNOSIDES SCH TAB: 8.6 TABLET, FILM COATED ORAL at 09:50

## 2017-09-24 RX ADMIN — WATER SCH NOTE: 100 INJECTION, SOLUTION INTRAVENOUS at 21:05

## 2017-09-24 RX ADMIN — HEPARIN SODIUM SCH UNITS: 5000 INJECTION INTRAVENOUS; SUBCUTANEOUS at 22:02

## 2017-09-24 RX ADMIN — ACETAMINOPHEN SCH MG: 325 TABLET ORAL at 09:45

## 2017-09-24 RX ADMIN — ACETAMINOPHEN SCH MG: 325 TABLET ORAL at 12:55

## 2017-09-24 RX ADMIN — INSULIN LISPRO SCH: 100 INJECTION, SOLUTION INTRAVENOUS; SUBCUTANEOUS at 16:50

## 2017-09-24 RX ADMIN — ATENOLOL SCH: 50 TABLET ORAL at 11:19

## 2017-09-24 RX ADMIN — LEVOTHYROXINE SODIUM SCH MCG: 75 TABLET ORAL at 05:40

## 2017-09-24 RX ADMIN — INSULIN LISPRO SCH: 100 INJECTION, SOLUTION INTRAVENOUS; SUBCUTANEOUS at 21:05

## 2017-09-24 RX ADMIN — SENNOSIDES SCH TAB: 8.6 TABLET, FILM COATED ORAL at 20:51

## 2017-09-24 RX ADMIN — LIDOCAINE SCH PATCH: 50 PATCH CUTANEOUS at 10:09

## 2017-09-24 RX ADMIN — AMLODIPINE BESYLATE SCH MG: 5 TABLET ORAL at 09:44

## 2017-09-24 RX ADMIN — ATORVASTATIN CALCIUM SCH MG: 10 TABLET, FILM COATED ORAL at 09:50

## 2017-09-24 NOTE — RAD
Indication: Hypoxia, shortness of breath.



Single frontal view of the chest performed at 0805 hours was reviewed.



Comparison is made with previous exam dated November 08, 2013.



Cardiomegaly with interstitial edema is noted. There may be pleural effusions noted.







IMPRESSION: CARDIOMEGALY WITH INTERSTITIAL EDEMA LIKELY PLEURAL EFFUSIONS.

## 2017-09-24 NOTE — PN
Subjective


Date of Service: 09/24/17


Interval History: 





Patient seen this morning. More alert and oriented. Recalls why she is here and 

what happened. Reports feeling better than yesterday, still with pain. Not 

taking much PO. Denies SOB, subjective f/c


Family History: Unchanged from Admission


Social History: Unchanged from Admission


Past Medical History: Unchanged from Admission





Objective


Active Medications: 








Acetaminophen (Tylenol Tab*)  975 mg PO TID [facility]


Allopurinol (Zyloprim Tab*)  100 mg PO DAILY [facility]


Amlodipine Besylate (Norvasc Tab*)  2.5 mg PO DAILY [facility]


Atenolol (Tenormin Tab*)  50 mg PO DAILY [facility]


Atorvastatin Calcium (Lipitor*)  10 mg PO DAILY [facility]


Calcitriol (Rocaltrol  Cap*)  0.25 mcg PO DAILY [facility]


Dextrose (D50w Syringe 50 Ml*)  12.5 gm IV PUSH .FOR FS < 60 - SS PRN


Docusate Sodium (Colace Cap*)  100 mg PO BID [facility]


Heparin Sodium (Porcine) (Heparin Vial(*))  5,000 units SUBCUT Q8HR [facility]


Insulin Glargine (Lantus(*))  10 units SUBCUT Q24H [facility]


Insulin Human Lispro (Humalog*)  0 units SUBCUT ACHS [facility]


Levothyroxine Sodium (Synthroid Tab*)  75 mcg PO 0600 [facility]


Lidocaine (Lidoderm 5% Patch*)  1 patch TRANSDERM 0900 [facility]


Pharmacy Profile Note (Lidocaine Patch Remove*)  1 note PATCH OFF 2100 [facility]


Senna (Senokot Tab*)  1 tab PO BID [facility]





 Vital Signs











  09/23/17 09/23/17 09/23/17





  08:45 09:32 09:33


 


Temperature   


 


Pulse Rate   


 


Respiratory 20 22 22





Rate   


 


Blood Pressure   





(mmHg)   


 


O2 Sat by Pulse   





Oximetry   














  09/23/17 09/23/17 09/24/17





  20:05 23:42 02:22


 


Temperature 100.5 F 100.1 F 98.8 F


 


Pulse Rate 51 52 50


 


Respiratory 20 24 24





Rate   


 


Blood Pressure 127/33 122/58 131/43





(mmHg)   


 


O2 Sat by Pulse 98 96 97





Oximetry   











Oxygen Devices in Use Now: Simple Face Mask - 5L


Appearance: Elderly,  F, laying in bed in mild distress


Eyes: No Scleral Icterus


Ears/Nose/Mouth/Throat: - - Dry MM


Neck: NL Appearance and Movements; NL JVP


Respiratory: - - Mild tachypnea, lungs seem clear at bases but exam limited due 

to pain


Cardiovascular: NL Sounds; No Murmurs; No JVD, RRR


Abdominal: NL Sounds; No Tenderness; No Distention


Lymphatic: No Cervical Adenopathy


Extremities: No Edema


Skin: No Rash or Ulcers


Neurological: Alert and Oriented x 3


Result Diagrams: 


 09/22/17 05:54





 09/23/17 14:09





Assess/Plan/Problems-Billing


Assessment: 


Mechanical fall and subsequent rib fxs in a 77 yo F with hx of HTN, HLD, obesity

, O2 dependent COPD, DM








- Patient Problems


(1) Rib fractures


Current Visit: Yes   Comment: Continue ATC tylenol. Now that mental status 

improved will start PO tramadol, if tolerates and still has pain can go from 

there. Holding morphine due to renal failure. Continue Lidoderm patch. 

Incentive spirometer. PT/OT   





(2) Fever


Current Visit: Yes   Comment: Low grade. Concern for possible PNA due to 

splinting. CBC and CXR   





(3) Diabetes


Current Visit: Yes   Comment: BGs low this morning. Hold Glipizide and Lantus. 

Continue HISS. Holding home metformin.   





(4) MARISSA (acute kidney injury)


Current Visit: Yes   Comment: on CKD. Today's BMP is pending. Got 1L additinal 

IVF today, may need more as she is not taking much PO. FeNa <1%. No signs of 

infection. Hold renally-cleared centrally acting meds. Got kayexalate yesterday 

for hyperkalemia.    





(5) HTN (hypertension)


Current Visit: Yes   Comment: Continue Atenolol. Holding home Lisinopril.    





(6) O2 dependent


Current Visit: Yes   Comment: Patient denies COPD but is on home O2.   





(7) DVT prophylaxis


Current Visit: Yes   Comment: HSQ   


Status and Disposition: 





Inpatient for continued pain control, renal failure

## 2017-09-25 LAB
ANION GAP SERPL CALC-SCNC: 9 MMOL/L (ref 2–11)
BUN SERPL-MCNC: 65 MG/DL (ref 6–24)
BUN/CREAT SERPL: 22.3 (ref 8–20)
CALCIUM SERPL-MCNC: 8.5 MG/DL (ref 8.6–10.3)
CHLORIDE SERPL-SCNC: 108 MMOL/L (ref 101–111)
GLUCOSE SERPL-MCNC: 62 MG/DL (ref 70–100)
HCO3 SERPL-SCNC: 19 MMOL/L (ref 22–32)
HCT VFR BLD AUTO: 30 % (ref 35–47)
HGB BLD-MCNC: 9.7 G/DL (ref 12–16)
MCH RBC QN AUTO: 33 PG (ref 27–31)
MCHC RBC AUTO-ENTMCNC: 32 G/DL (ref 31–36)
MCV RBC AUTO: 102 FL (ref 80–97)
POTASSIUM SERPL-SCNC: 5.4 MMOL/L (ref 3.5–5)
RBC # BLD AUTO: 2.94 10^6/UL (ref 4–5.4)
SODIUM SERPL-SCNC: 136 MMOL/L (ref 133–145)
WBC # BLD AUTO: 13 10^3/UL (ref 3.5–10.8)

## 2017-09-25 RX ADMIN — INSULIN LISPRO SCH UNITS: 100 INJECTION, SOLUTION INTRAVENOUS; SUBCUTANEOUS at 12:16

## 2017-09-25 RX ADMIN — DOCUSATE SODIUM SCH MG: 100 CAPSULE, LIQUID FILLED ORAL at 09:17

## 2017-09-25 RX ADMIN — INSULIN LISPRO SCH: 100 INJECTION, SOLUTION INTRAVENOUS; SUBCUTANEOUS at 08:08

## 2017-09-25 RX ADMIN — LEVOTHYROXINE SODIUM SCH MCG: 75 TABLET ORAL at 05:27

## 2017-09-25 RX ADMIN — SENNOSIDES SCH TAB: 8.6 TABLET, FILM COATED ORAL at 09:17

## 2017-09-25 RX ADMIN — INSULIN LISPRO SCH UNITS: 100 INJECTION, SOLUTION INTRAVENOUS; SUBCUTANEOUS at 21:14

## 2017-09-25 RX ADMIN — ATENOLOL SCH MG: 25 TABLET ORAL at 12:17

## 2017-09-25 RX ADMIN — INSULIN LISPRO SCH UNITS: 100 INJECTION, SOLUTION INTRAVENOUS; SUBCUTANEOUS at 17:38

## 2017-09-25 RX ADMIN — ALLOPURINOL SCH MG: 100 TABLET ORAL at 09:17

## 2017-09-25 RX ADMIN — METHADONE HYDROCHLORIDE SCH MG: 5 TABLET ORAL at 12:17

## 2017-09-25 RX ADMIN — ACETAMINOPHEN SCH MG: 325 TABLET ORAL at 20:48

## 2017-09-25 RX ADMIN — ACETAMINOPHEN SCH MG: 325 TABLET ORAL at 13:32

## 2017-09-25 RX ADMIN — CALCITRIOL SCH MCG: 0.25 CAPSULE ORAL at 09:17

## 2017-09-25 RX ADMIN — HEPARIN SODIUM SCH UNITS: 5000 INJECTION INTRAVENOUS; SUBCUTANEOUS at 13:40

## 2017-09-25 RX ADMIN — SENNOSIDES SCH TAB: 8.6 TABLET, FILM COATED ORAL at 20:49

## 2017-09-25 RX ADMIN — ONDANSETRON PRN MG: 2 INJECTION INTRAMUSCULAR; INTRAVENOUS at 09:01

## 2017-09-25 RX ADMIN — DOCUSATE SODIUM SCH MG: 100 CAPSULE, LIQUID FILLED ORAL at 20:49

## 2017-09-25 RX ADMIN — ATENOLOL SCH: 50 TABLET ORAL at 09:27

## 2017-09-25 RX ADMIN — FENTANYL CITRATE PRN MCG: 0.05 INJECTION, SOLUTION INTRAMUSCULAR; INTRAVENOUS at 20:45

## 2017-09-25 RX ADMIN — LIDOCAINE SCH PATCH: 50 PATCH CUTANEOUS at 09:26

## 2017-09-25 RX ADMIN — METHADONE HYDROCHLORIDE SCH MG: 5 TABLET ORAL at 22:52

## 2017-09-25 RX ADMIN — OXYCODONE HYDROCHLORIDE PRN MG: 5 CAPSULE ORAL at 14:49

## 2017-09-25 RX ADMIN — ATORVASTATIN CALCIUM SCH MG: 10 TABLET, FILM COATED ORAL at 09:17

## 2017-09-25 RX ADMIN — HEPARIN SODIUM SCH UNITS: 5000 INJECTION INTRAVENOUS; SUBCUTANEOUS at 22:21

## 2017-09-25 RX ADMIN — WATER SCH NOTE: 100 INJECTION, SOLUTION INTRAVENOUS at 20:49

## 2017-09-25 RX ADMIN — HEPARIN SODIUM SCH UNITS: 5000 INJECTION INTRAVENOUS; SUBCUTANEOUS at 05:26

## 2017-09-25 RX ADMIN — FENTANYL CITRATE PRN MCG: 0.05 INJECTION, SOLUTION INTRAMUSCULAR; INTRAVENOUS at 16:15

## 2017-09-25 RX ADMIN — ACETAMINOPHEN SCH MG: 325 TABLET ORAL at 09:17

## 2017-09-25 RX ADMIN — AMLODIPINE BESYLATE SCH MG: 5 TABLET ORAL at 09:17

## 2017-09-25 RX ADMIN — TRAMADOL HYDROCHLORIDE PRN MG: 50 TABLET, FILM COATED ORAL at 13:32

## 2017-09-25 NOTE — PN
Subjective


Date of Service: 09/25/17


Interval History: 





Pt is feeling poorly. She just got back into bed from the chair. She states 

even at rest she is having uncontrolled pain. She denies any SOB. She has not 

had a BM since this past Friday.


Family History: Unchanged from Admission


Social History: Unchanged from Admission


Past Medical History: Unchanged from Admission





Objective


Active Medications: 








Acetaminophen (Tylenol Tab*)  [facility]


   Last Admin: 09/25/17 09:17 Dose:  975 mg


Allopurinol (Zyloprim Tab*)  100 mg PO DAILY [facility]


   Last Admin: 09/25/17 09:17 Dose:  100 mg


Amlodipine Besylate (Norvasc Tab*)  2.5 mg PO DAILY [facility]


   Last Admin: 09/25/17 09:17 Dose:  2.5 mg


Atenolol (Tenormin Tab*)  25 mg PO DAILY [facility]


Atorvastatin Calcium (Lipitor*)  10 mg PO DAILY [facility]


   Last Admin: 09/25/17 09:17 Dose:  10 mg


Calcitriol (Rocaltrol  Cap*)  0.25 mcg PO DAILY [facility]


   Last Admin: 09/25/17 09:17 Dose:  0.25 mcg


Dextrose (D50w Syringe 50 Ml*)  12.5 gm IV PUSH .FOR FS < 60 - SS PRN


   PRN Reason: FS < 60


Docusate Sodium (Colace Cap*)  100 mg PO BID [facility]


   Last Admin: 09/25/17 09:17 Dose:  100 mg


Heparin Sodium (Porcine) (Heparin Vial(*))  5,000 units SUBCUT Q8HR [facility]


   Last Admin: 09/25/17 05:26 Dose:  5,000 units


Levofloxacin/Dextrose (Levaquin 250 Mg Ivpremx(*))  250 mg in 50 mls @ 50 mls/

hr IVPB Q48H [facility]


Insulin Human Lispro (Humalog*)  0 units SUBCUT ACHS [facility]


   PRN Reason: Protocol


   Last Admin: 09/25/17 08:08 Dose:  Not Given


Levothyroxine Sodium (Synthroid Tab*)  75 mcg PO 0600 [facility]


   Last Admin: 09/25/17 05:27 Dose:  75 mcg


Lidocaine (Lidoderm 5% Patch*)  1 patch TRANSDERM 0900 [facility]


   Last Admin: 09/25/17 09:26 Dose:  1 patch


Nystatin (Nystatin Top Powder*)  1 applic TOPICAL TID PRN


   PRN Reason: RASH


Ondansetron HCl (Zofran Inj*)  4 mg IV Q6H PRN


   PRN Reason: NAUSEA


   Last Admin: 09/25/17 09:01 Dose:  4 mg


Pharmacy Profile Note (Lidocaine Patch Remove*)  1 note PATCH OFF 2100 [facility]


   Last Admin: 09/24/17 21:05 Dose:  1 note


Senna (Senokot Tab*)  1 tab PO BID [facility]


   Last Admin: 09/25/17 09:17 Dose:  1 tab


Tramadol HCl (Ultram*)  50 mg PO Q6H PRN


   PRN Reason: PAIN


   Last Admin: 09/24/17 20:51 Dose:  50 mg








 Vital Signs











  09/24/17 09/24/17 09/24/17





  11:27 13:27 15:33


 


Temperature   98.2 F


 


Pulse Rate   55


 


Respiratory 20 16 20





Rate   


 


Blood Pressure   149/54





(mmHg)   


 


O2 Sat by Pulse   97





Oximetry   














  09/24/17 09/24/17 09/24/17





  16:00 19:46 20:34


 


Temperature   


 


Pulse Rate  49 


 


Respiratory  16 16





Rate   


 


Blood Pressure  138/48 





(mmHg)   


 


O2 Sat by Pulse 97 97 





Oximetry   














  09/24/17 09/24/17 09/24/17





  20:51 22:51 23:51


 


Temperature   98.2 F


 


Pulse Rate   54


 


Respiratory 18 20 20





Rate   


 


Blood Pressure   126/36





(mmHg)   


 


O2 Sat by Pulse   96





Oximetry   














  09/25/17





  02:47


 


Temperature 97.4 F


 


Pulse Rate 60


 


Respiratory 20





Rate 


 


Blood Pressure 143/39





(mmHg) 


 


O2 Sat by Pulse 97





Oximetry 











Oxygen Devices in Use Now: Nasal Cannula - 3L


Appearance: Elderly female sitting up in bed, NAD


Eyes: No Scleral Icterus


Ears/Nose/Mouth/Throat: Mucous Membranes Moist


Respiratory: Symmetrical Chest Expansion and Respiratory Effort, Clear to 

Auscultation


Cardiovascular: NL Sounds; No Murmurs; No JVD, RRR, No Edema


Abdominal: NL Sounds; No Tenderness; No Distention


Extremities: No Clubbing, Cyanosis


Skin: No Rash or Ulcers, No Nodules or Sclerosis


Neurological: Alert and Oriented x 3


Result Diagrams: 


 09/25/17 06:03





 09/25/17 06:03





Assess/Plan/Problems-Billing





Mechanical fall and subsequent rib fxs in a 77 yo F with hx of HTN, HLD, obesity

, O2 dependent COPD and DM.








- Patient Problems


(1) Rib fractures


Current Visit: Yes   Status: Acute   Code(s): S22.39XA - FRACTURE OF ONE RIB, 

UNSP SIDE, INIT FOR CLOS FX   SNOMED Code(s): 59456125


   Comment: The patient has posterolateral rib fractures on the left. Her pain 

is uncontrolled will continue ATC tylenol and start methadone 5mg BID to see if 

her pain is improved. Tramadol does not help at this time but perhaps with the 

methadone on board the tramadol will be enough for breakthrough pain. Continue 

lidoderm patch. Continue PT/OT.    





(2) Fever


Current Visit: Yes   Status: Acute   Code(s): R50.9 - FEVER, UNSPECIFIED   

SNOMED Code(s): 843876978


   Comment: The patient had a low grade fever on 9/23/17. WBC count and CXR are 

concerning for pna related to splinting from her rib fractures. Will continue 

levaquin renally dosed.    





(3) Acute kidney injury superimposed on CKD


Current Visit: Yes   Status: Acute   Code(s): N17.9 - ACUTE KIDNEY FAILURE, 

UNSPECIFIED; N18.9 - CHRONIC KIDNEY DISEASE, UNSPECIFIED   SNOMED Code(s): 

70816725


   Comment: The patient has baseline stage III CKD with a baseline creatinine 

around 1.8 who developed acute kidney failure while in the hospital-? secondary 

to poor oral intake. Her creatinine peaked at 3.34 and has now trended down to 

2.91. IVF has been stopped. Encourage good oral intake. Will follow up BMP 

tomorrow.   





(4) HTN (hypertension)


Current Visit: Yes   Status: Acute   Code(s): I10 - ESSENTIAL (PRIMARY) 

HYPERTENSION   SNOMED Code(s): 00794297


   Comment: BP is under fair control on amlodipine daily (atenolol has been 

held intermittently).   





(5) Diabetes


Current Visit: Yes   Status: Acute   Code(s): E11.9 - TYPE 2 DIABETES MELLITUS 

WITHOUT COMPLICATIONS   SNOMED Code(s): 68659403


   Comment: Sugars remain slightly low to low normal. Continue to hold 

glipizide and metformin (pt should not be on this any further as her baseline 

creatinine is elevated). Continue the lispro sliding scale for now. I suspect 

her sugars may be down because of her worsened renal function.    





(6) COPD (chronic obstructive pulmonary disease)


Current Visit: Yes   Status: Acute   Code(s): J44.9 - CHRONIC OBSTRUCTIVE 

PULMONARY DISEASE, UNSPECIFIED   SNOMED Code(s): 58135032


   Comment: No signs of exacerbation at this time. She has COPD and chronic 

hypoxic respiratory failure requiring continous O2 supplementation at 2L.    





(7) DVT prophylaxis


Current Visit: Yes   Status: Acute   Code(s): COS0402 -    SNOMED Code(s): 

892684815


   Comment: SQ heparin   





(8) Full code status


Current Visit: Yes   Status: Acute   Code(s): Z78.9 - OTHER SPECIFIED HEALTH 

STATUS   SNOMED Code(s): 797921776


   


Status and Disposition: 





Inpatient for continued pain control, renal failure

## 2017-09-26 LAB
ANION GAP SERPL CALC-SCNC: 11 MMOL/L (ref 2–11)
BUN SERPL-MCNC: 71 MG/DL (ref 6–24)
BUN/CREAT SERPL: 28.3 (ref 8–20)
CALCIUM SERPL-MCNC: 9.4 MG/DL (ref 8.6–10.3)
CHLORIDE SERPL-SCNC: 107 MMOL/L (ref 101–111)
GLUCOSE SERPL-MCNC: 206 MG/DL (ref 70–100)
HCO3 SERPL-SCNC: 19 MMOL/L (ref 22–32)
POTASSIUM SERPL-SCNC: 4.9 MMOL/L (ref 3.5–5)
SODIUM SERPL-SCNC: 137 MMOL/L (ref 133–145)

## 2017-09-26 RX ADMIN — HEPARIN SODIUM SCH UNITS: 5000 INJECTION INTRAVENOUS; SUBCUTANEOUS at 20:48

## 2017-09-26 RX ADMIN — MORPHINE SULFATE PRN MG: 10 SOLUTION ORAL at 18:16

## 2017-09-26 RX ADMIN — OXYCODONE HYDROCHLORIDE PRN MG: 5 CAPSULE ORAL at 09:46

## 2017-09-26 RX ADMIN — ACETAMINOPHEN SCH MG: 325 TABLET ORAL at 20:47

## 2017-09-26 RX ADMIN — LIDOCAINE SCH PATCH: 50 PATCH CUTANEOUS at 08:21

## 2017-09-26 RX ADMIN — ACETAMINOPHEN SCH MG: 325 TABLET ORAL at 14:06

## 2017-09-26 RX ADMIN — LEVOFLOXACIN SCH MLS/HR: 5 INJECTION, SOLUTION INTRAVENOUS at 16:09

## 2017-09-26 RX ADMIN — METHADONE HYDROCHLORIDE SCH MG: 5 TABLET ORAL at 11:42

## 2017-09-26 RX ADMIN — MORPHINE SULFATE PRN MG: 10 SOLUTION ORAL at 22:20

## 2017-09-26 RX ADMIN — CALCITRIOL SCH MCG: 0.25 CAPSULE ORAL at 08:26

## 2017-09-26 RX ADMIN — LEVOTHYROXINE SODIUM SCH MCG: 75 TABLET ORAL at 05:21

## 2017-09-26 RX ADMIN — INSULIN LISPRO SCH UNITS: 100 INJECTION, SOLUTION INTRAVENOUS; SUBCUTANEOUS at 17:04

## 2017-09-26 RX ADMIN — DOCUSATE SODIUM SCH MG: 100 CAPSULE, LIQUID FILLED ORAL at 20:47

## 2017-09-26 RX ADMIN — INSULIN LISPRO SCH UNITS: 100 INJECTION, SOLUTION INTRAVENOUS; SUBCUTANEOUS at 20:47

## 2017-09-26 RX ADMIN — OXYCODONE HYDROCHLORIDE PRN MG: 5 CAPSULE ORAL at 00:35

## 2017-09-26 RX ADMIN — TRAMADOL HYDROCHLORIDE PRN MG: 50 TABLET, FILM COATED ORAL at 08:34

## 2017-09-26 RX ADMIN — SENNOSIDES SCH TAB: 8.6 TABLET, FILM COATED ORAL at 08:26

## 2017-09-26 RX ADMIN — FENTANYL CITRATE PRN MCG: 0.05 INJECTION, SOLUTION INTRAMUSCULAR; INTRAVENOUS at 07:21

## 2017-09-26 RX ADMIN — AMLODIPINE BESYLATE SCH MG: 5 TABLET ORAL at 08:26

## 2017-09-26 RX ADMIN — ATENOLOL SCH MG: 25 TABLET ORAL at 08:27

## 2017-09-26 RX ADMIN — HEPARIN SODIUM SCH UNITS: 5000 INJECTION INTRAVENOUS; SUBCUTANEOUS at 14:06

## 2017-09-26 RX ADMIN — INSULIN LISPRO SCH UNITS: 100 INJECTION, SOLUTION INTRAVENOUS; SUBCUTANEOUS at 08:24

## 2017-09-26 RX ADMIN — ALLOPURINOL SCH MG: 100 TABLET ORAL at 08:27

## 2017-09-26 RX ADMIN — WATER SCH NOTE: 100 INJECTION, SOLUTION INTRAVENOUS at 20:51

## 2017-09-26 RX ADMIN — ACETAMINOPHEN SCH MG: 325 TABLET ORAL at 08:27

## 2017-09-26 RX ADMIN — ATORVASTATIN CALCIUM SCH MG: 10 TABLET, FILM COATED ORAL at 08:27

## 2017-09-26 RX ADMIN — DOCUSATE SODIUM SCH MG: 100 CAPSULE, LIQUID FILLED ORAL at 08:27

## 2017-09-26 RX ADMIN — HEPARIN SODIUM SCH UNITS: 5000 INJECTION INTRAVENOUS; SUBCUTANEOUS at 05:21

## 2017-09-26 RX ADMIN — INSULIN LISPRO SCH UNITS: 100 INJECTION, SOLUTION INTRAVENOUS; SUBCUTANEOUS at 12:13

## 2017-09-26 RX ADMIN — MORPHINE SULFATE PRN MG: 10 SOLUTION ORAL at 14:06

## 2017-09-26 RX ADMIN — SENNOSIDES SCH TAB: 8.6 TABLET, FILM COATED ORAL at 20:47

## 2017-09-26 NOTE — PN
Subjective


Date of Service: 09/26/17


Interval History: 





Patient seen and examined at bedside. Patient c/o of uncontrolled pain limiting 

her breathing.  She states "nothing" really improves her pain.  She can pull 1L 

on IS.


Family History: Unchanged from Admission


Social History: Unchanged from Admission


Past Medical History: Unchanged from Admission





Objective


Active Medications: 








Acetaminophen (Tylenol Tab*)  975 mg PO TID [facility]


Allopurinol (Zyloprim Tab*)  100 mg PO DAILY [facility]


Amlodipine Besylate (Norvasc Tab*)  2.5 mg PO DAILY [facility]


Atenolol (Tenormin Tab*)  25 mg PO DAILY [facility]


Atorvastatin Calcium (Lipitor*)  10 mg PO DAILY [facility]


Calcitriol (Rocaltrol  Cap*)  0.25 mcg PO DAILY [facility]


Docusate Sodium (Colace Cap*)  100 mg PO BID [facility]


Heparin Sodium (Porcine) (Heparin Vial(*))  5,000 units SUBCUT Q8HR [facility]


Levofloxacin/Dextrose (Levaquin 250 Mg Ivpremx(*))  250 mg in 50 mls @ 50 mls/

hr IVPB Q48H [facility]


Insulin Human Lispro (Humalog*)  0 units SUBCUT ACHS [facility]


Levothyroxine Sodium (Synthroid Tab*)  75 mcg PO 0600 [facility]


Lidocaine (Lidoderm 5% Patch*)  1 patch TRANSDERM 0900 [facility]


Morphine Sulfate (Morphine Oral.Soln 10 Mg*)  5 mg PO Q4H PRN


Nystatin (Nystatin Top Powder*)  1 applic TOPICAL TID PRN


Ondansetron HCl (Zofran Inj*)  4 mg IV Q6H PRN


Pharmacy Profile Note (Lidocaine Patch Remove*)  1 note PATCH OFF 2100 [facility]


Senna (Senokot Tab*)  1 tab PO BID [facility]


























  09/26/17 09/26/17 09/26/17





  07:21 07:29 07:40


 


Temperature  97.9 F 


 


Pulse Rate  62 


 


Respiratory 24 17 24





Rate   


 


Blood Pressure  173/56 





(mmHg)   


 


O2 Sat by Pulse  95 





Oximetry   




















Oxygen Devices in Use Now: Nasal Cannula - 3L


Appearance: sitting up in bed. Using accessory muscles to breathe


Ears/Nose/Mouth/Throat: NL Teeth, Lips, Gums, Mucous Membranes Moist


Neck: NL Appearance and Movements; NL JVP


Respiratory: Symmetrical Chest Expansion and Respiratory Effort, - - decreased 

at bases


Cardiovascular: NL Sounds; No Murmurs; No JVD, RRR


Abdominal: NL Sounds; No Tenderness; No Distention


Skin: No Rash or Ulcers


Neurological: Alert and Oriented x 3, NL Muscle Strength and Tone


Nutrition: Taking PO's


Result Diagrams: 


 09/25/17 06:03





 09/26/17 08:16





Assess/Plan/Problems-Billing





Mechanical fall and subsequent rib fxs in a 77 yo F with hx of HTN, HLD, obesity

, O2 dependent COPD and DM.








- Patient Problems


(1) Rib fractures


Comment: The patient has posterolateral rib fractures on the left and pain 

uncontrolled.  She became too lethargic over the weekend from IV Morphine.  

Will start low dose MS Contin with short acting morphine for breakthrough with 

strict hold parameters.  Continue Lidoderm patch, ATC Tylenol and IS.    





(2) Fever


Comment: The patient had a low grade fever on 9/23/17. WBC count and CXR are 

concerning for PNA related to splinting from her rib fractures. Continue 

renally dosed Levaquin.   





(3) Acute kidney injury superimposed on CKD


Comment: The patient has baseline stage III CKD with a baseline creatinine 

around 1.8 who developed acute kidney failure while in the hospital-? secondary 

to poor oral intake. Her creatinine peaked at 3.34 and has now trended down to 

2.5.  Continue to trend Cr.   





(4) COPD (chronic obstructive pulmonary disease)


Comment: No signs of exacerbation at this time. She has COPD and chronic 

hypoxic respiratory failure requiring continous O2 supplementation at 2L.    





(5) Diabetes


Comment: Sugars slightly up in 200s. Continue to hold glipizide and metformin (

pt should not be on this any further as her baseline creatinine is elevated). 

Increase sliding scale coverage.   





(6) HTN (hypertension)


Comment: BP is under fair control on amlodipine and atenolol. Lisinopril held. 

  





(7) DVT prophylaxis


Comment: SQ heparin   





(8) Full code status





Status and Disposition: 





Inpatient for continued pain control, renal failure

## 2017-09-27 LAB
ADD DIFF/SLIDE REVIEW?: (no result)
ANION GAP SERPL CALC-SCNC: 10 MMOL/L (ref 2–11)
BUN SERPL-MCNC: 80 MG/DL (ref 6–24)
BUN/CREAT SERPL: 35.9 (ref 8–20)
CALCIUM SERPL-MCNC: 9.4 MG/DL (ref 8.6–10.3)
CHLORIDE SERPL-SCNC: 106 MMOL/L (ref 101–111)
GLUCOSE SERPL-MCNC: 166 MG/DL (ref 70–100)
HCO3 SERPL-SCNC: 18 MMOL/L (ref 22–32)
HCT VFR BLD AUTO: 32 % (ref 35–47)
HGB BLD-MCNC: 10.2 G/DL (ref 12–16)
MCH RBC QN AUTO: 33 PG (ref 27–31)
MCHC RBC AUTO-ENTMCNC: 32 G/DL (ref 31–36)
MCV RBC AUTO: 103 FL (ref 80–97)
POTASSIUM SERPL-SCNC: (no result) MMOL/L (ref 3.5–5)
RBC # BLD AUTO: 3.08 10^6/UL (ref 4–5.4)
SODIUM SERPL-SCNC: 134 MMOL/L (ref 133–145)
WBC # BLD AUTO: 23.4 10^3/UL (ref 3.5–10.8)

## 2017-09-27 RX ADMIN — ACETAMINOPHEN SCH MG: 325 TABLET ORAL at 09:26

## 2017-09-27 RX ADMIN — ALLOPURINOL SCH MG: 100 TABLET ORAL at 09:26

## 2017-09-27 RX ADMIN — ATORVASTATIN CALCIUM SCH MG: 10 TABLET, FILM COATED ORAL at 09:26

## 2017-09-27 RX ADMIN — AMLODIPINE BESYLATE SCH MG: 5 TABLET ORAL at 09:25

## 2017-09-27 RX ADMIN — HEPARIN SODIUM SCH UNITS: 5000 INJECTION INTRAVENOUS; SUBCUTANEOUS at 21:56

## 2017-09-27 RX ADMIN — SENNOSIDES SCH TAB: 8.6 TABLET, FILM COATED ORAL at 21:54

## 2017-09-27 RX ADMIN — SENNOSIDES SCH TAB: 8.6 TABLET, FILM COATED ORAL at 09:25

## 2017-09-27 RX ADMIN — DOCUSATE SODIUM SCH MG: 100 CAPSULE, LIQUID FILLED ORAL at 09:26

## 2017-09-27 RX ADMIN — MORPHINE SULFATE SCH MG: 15 TABLET, EXTENDED RELEASE ORAL at 09:25

## 2017-09-27 RX ADMIN — ACETAMINOPHEN SCH MG: 325 TABLET ORAL at 12:54

## 2017-09-27 RX ADMIN — DOCUSATE SODIUM SCH MG: 100 CAPSULE, LIQUID FILLED ORAL at 21:55

## 2017-09-27 RX ADMIN — MORPHINE SULFATE PRN MG: 10 SOLUTION ORAL at 09:26

## 2017-09-27 RX ADMIN — MORPHINE SULFATE SCH MG: 15 TABLET, EXTENDED RELEASE ORAL at 20:24

## 2017-09-27 RX ADMIN — CALCITRIOL SCH MCG: 0.25 CAPSULE ORAL at 09:25

## 2017-09-27 RX ADMIN — MORPHINE SULFATE PRN MG: 10 SOLUTION ORAL at 04:42

## 2017-09-27 RX ADMIN — HEPARIN SODIUM SCH UNITS: 5000 INJECTION INTRAVENOUS; SUBCUTANEOUS at 04:43

## 2017-09-27 RX ADMIN — MORPHINE SULFATE PRN MG: 10 SOLUTION ORAL at 14:21

## 2017-09-27 RX ADMIN — WATER SCH NOTE: 100 INJECTION, SOLUTION INTRAVENOUS at 21:57

## 2017-09-27 RX ADMIN — LIDOCAINE SCH PATCH: 50 PATCH CUTANEOUS at 09:26

## 2017-09-27 RX ADMIN — ATENOLOL SCH: 25 TABLET ORAL at 08:49

## 2017-09-27 RX ADMIN — HEPARIN SODIUM SCH UNITS: 5000 INJECTION INTRAVENOUS; SUBCUTANEOUS at 12:54

## 2017-09-27 RX ADMIN — NYSTATIN PRN APPLIC: 100000 POWDER TOPICAL at 22:25

## 2017-09-27 RX ADMIN — MORPHINE SULFATE PRN MG: 10 SOLUTION ORAL at 18:24

## 2017-09-27 RX ADMIN — ACETAMINOPHEN SCH MG: 325 TABLET ORAL at 21:54

## 2017-09-27 RX ADMIN — LEVOTHYROXINE SODIUM SCH MCG: 75 TABLET ORAL at 04:42

## 2017-09-27 RX ADMIN — INSULIN LISPRO SCH UNITS: 100 INJECTION, SOLUTION INTRAVENOUS; SUBCUTANEOUS at 09:24

## 2017-09-27 RX ADMIN — INSULIN LISPRO SCH UNITS: 100 INJECTION, SOLUTION INTRAVENOUS; SUBCUTANEOUS at 17:19

## 2017-09-27 RX ADMIN — INSULIN LISPRO SCH UNITS: 100 INJECTION, SOLUTION INTRAVENOUS; SUBCUTANEOUS at 21:54

## 2017-09-27 RX ADMIN — INSULIN LISPRO SCH UNITS: 100 INJECTION, SOLUTION INTRAVENOUS; SUBCUTANEOUS at 12:54

## 2017-09-27 NOTE — PN
Subjective


Date of Service: 09/27/17


Interval History: 





Patient seen and examined at bedside. Patient states her pain has improved.  

She can pull 1.5L on IS.  She is down to 3L.  She still has pain with movement.

  Had a bowel movement yesterday.


Family History: Unchanged from Admission


Social History: Unchanged from Admission


Past Medical History: Unchanged from Admission





Objective


Active Medications: 








Acetaminophen (Tylenol Tab*)  975 mg PO TID [facility]


Allopurinol (Zyloprim Tab*)  100 mg PO DAILY [facility]


Amlodipine Besylate (Norvasc Tab*)  2.5 mg PO DAILY [facility]


Atenolol (Tenormin Tab*)  25 mg PO DAILY [facility]


Atorvastatin Calcium (Lipitor*)  10 mg PO DAILY [facility]


Calcitriol (Rocaltrol  Cap*)  0.25 mcg PO DAILY [facility]


Dextrose (D50w Syringe 50 Ml*)  12.5 gm IV PUSH .FOR FS < 60 - SS PRN


Docusate Sodium (Colace Cap*)  100 mg PO BID [facility]


Heparin Sodium (Porcine) (Heparin Vial(*))  5,000 units SUBCUT Q8HR [facility]


Levofloxacin/Dextrose (Levaquin 250 Mg Ivpremx(*))  250 mg in 50 mls @ 50 mls/

hr IVPB Q48H [facility]


Insulin Human Lispro (Humalog*)  0 units SUBCUT ACHS [facility]


Levothyroxine Sodium (Synthroid Tab*)  75 mcg PO 0600 [facility]


Lidocaine (Lidoderm 5% Patch*)  1 patch TRANSDERM 0900 [facility]


Morphine Sulfate (Ms Contin(*))  15 mg PO Q12H [facility]


Morphine Sulfate (Morphine Oral.Soln 10 Mg*)  10 mg PO Q4H PRN


Nystatin (Nystatin Top Powder*)  1 applic TOPICAL TID PRN


Ondansetron HCl (Zofran Inj*)  4 mg IV Q6H PRN


Pharmacy Profile Note (Lidocaine Patch Remove*)  1 note PATCH OFF 2100 [facility]


Senna (Senokot Tab*)  1 tab PO BID [facility]


























  09/27/17 09/27/17 09/27/17





  07:39 08:00 09:25


 


Temperature 97.9 F  


 


Pulse Rate 57  


 


Respiratory 18 18 20





Rate   


 


Blood Pressure 141/48  





(mmHg)   


 


O2 Sat by Pulse 99 99 





Oximetry   














Oxygen Devices in Use Now: Nasal Cannula - 3L


Appearance: sitting up in bed, NAD


Eyes: No Scleral Icterus, PERRLA


Ears/Nose/Mouth/Throat: NL Teeth, Lips, Gums, Mucous Membranes Moist


Neck: NL Appearance and Movements; NL JVP


Respiratory: Symmetrical Chest Expansion and Respiratory Effort, Clear to 

Auscultation


Cardiovascular: NL Sounds; No Murmurs; No JVD, RRR


Abdominal: NL Sounds; No Tenderness; No Distention


Skin: No Rash or Ulcers


Neurological: Alert and Oriented x 3, NL Muscle Strength and Tone


Lines/Tubes/Other Access: Clean, Dry and Intact Peripheral IV


Result Diagrams: 


 09/27/17 07:38





 09/27/17 09:38





Assess/Plan/Problems-Billing





Mechanical fall and subsequent rib fxs in a 77 yo F with hx of HTN, HLD, obesity

, O2 dependent COPD and DM.








- Patient Problems


(1) Rib fractures


Comment: Pain improved with MS Contin and PO PRN Morphine.  Will increase the 

dose to 10mg.  She continues to pull 1.5L on IS.  Continue Lidoderm patch and 

ATC Tylenol. Wean oxygen as tolerated. Completing a 5 day course of Levaquin 

for empiric tx of PNA given severity of rib fractures and fever on 9/23.   





(2) Fever


Comment: The patient had a low grade fever on 9/23/17. WBC count and CXR are 

concerning for PNA related to splinting from her rib fractures. Continue 

renally dosed Levaquin for empiric treatment.   





(3) Acute kidney injury superimposed on CKD


Comment: The patient has baseline stage III CKD with a baseline creatinine 

around 1.8 who developed acute kidney failure while in the hospital-? secondary 

to poor oral intake. Her creatinine peaked at 3.34 and has now trended down to 

2.2.  Continue to trend Cr.   





(4) COPD (chronic obstructive pulmonary disease)


Comment: No signs of exacerbation at this time. She has COPD and chronic 

hypoxic respiratory failure requiring continous O2 supplementation at 2L.    





(5) Diabetes


Comment: Sugars slightly up in 200s. Continue to hold glipizide and metformin (

pt should not be on this any further as her baseline creatinine is elevated). 

Increase sliding scale coverage.   





(6) HTN (hypertension)


Comment: BP is under fair control on amlodipine and atenolol. Lisinopril held. 

  





(7) DVT prophylaxis


Comment: SQ heparin   





(8) Full code status





Status and Disposition: 





Inpatient for continued pain control, renal failure. 2 person assist so will 

need short term rehab.  DC planners aware.

## 2017-09-28 LAB
ADD DIFF/SLIDE REVIEW?: (no result)
ANION GAP SERPL CALC-SCNC: 8 MMOL/L (ref 2–11)
BUN SERPL-MCNC: 79 MG/DL (ref 6–24)
BUN/CREAT SERPL: 41.1 (ref 8–20)
CALCIUM SERPL-MCNC: 9.8 MG/DL (ref 8.6–10.3)
CHLORIDE SERPL-SCNC: 106 MMOL/L (ref 101–111)
GLUCOSE SERPL-MCNC: 178 MG/DL (ref 70–100)
HCO3 SERPL-SCNC: 22 MMOL/L (ref 22–32)
HCT VFR BLD AUTO: 30 % (ref 35–47)
HGB BLD-MCNC: 9.9 G/DL (ref 12–16)
MCH RBC QN AUTO: 33 PG (ref 27–31)
MCHC RBC AUTO-ENTMCNC: 33 G/DL (ref 31–36)
MCV RBC AUTO: 102 FL (ref 80–97)
POTASSIUM SERPL-SCNC: 4.5 MMOL/L (ref 3.5–5)
RBC # BLD AUTO: 2.98 10^6/UL (ref 4–5.4)
SODIUM SERPL-SCNC: 136 MMOL/L (ref 133–145)
WBC # BLD AUTO: 11.8 10^3/UL (ref 3.5–10.8)

## 2017-09-28 RX ADMIN — ACETAMINOPHEN SCH MG: 325 TABLET ORAL at 07:58

## 2017-09-28 RX ADMIN — SENNOSIDES SCH TAB: 8.6 TABLET, FILM COATED ORAL at 07:59

## 2017-09-28 RX ADMIN — INSULIN LISPRO SCH UNITS: 100 INJECTION, SOLUTION INTRAVENOUS; SUBCUTANEOUS at 13:16

## 2017-09-28 RX ADMIN — AMLODIPINE BESYLATE SCH MG: 5 TABLET ORAL at 07:59

## 2017-09-28 RX ADMIN — HEPARIN SODIUM SCH UNITS: 5000 INJECTION INTRAVENOUS; SUBCUTANEOUS at 22:10

## 2017-09-28 RX ADMIN — LISINOPRIL SCH MG: 10 TABLET ORAL at 13:17

## 2017-09-28 RX ADMIN — ATENOLOL SCH MG: 25 TABLET ORAL at 07:59

## 2017-09-28 RX ADMIN — LEVOTHYROXINE SODIUM SCH MCG: 75 TABLET ORAL at 06:36

## 2017-09-28 RX ADMIN — LIDOCAINE SCH PATCH: 50 PATCH CUTANEOUS at 08:02

## 2017-09-28 RX ADMIN — ALLOPURINOL SCH MG: 100 TABLET ORAL at 07:59

## 2017-09-28 RX ADMIN — HEPARIN SODIUM SCH UNITS: 5000 INJECTION INTRAVENOUS; SUBCUTANEOUS at 13:16

## 2017-09-28 RX ADMIN — DOCUSATE SODIUM SCH MG: 100 CAPSULE, LIQUID FILLED ORAL at 20:49

## 2017-09-28 RX ADMIN — ACETAMINOPHEN SCH MG: 325 TABLET ORAL at 13:17

## 2017-09-28 RX ADMIN — CALCITRIOL SCH MCG: 0.25 CAPSULE ORAL at 07:59

## 2017-09-28 RX ADMIN — ATORVASTATIN CALCIUM SCH MG: 10 TABLET, FILM COATED ORAL at 07:58

## 2017-09-28 RX ADMIN — MORPHINE SULFATE SCH MG: 15 TABLET, EXTENDED RELEASE ORAL at 07:58

## 2017-09-28 RX ADMIN — WATER SCH NOTE: 100 INJECTION, SOLUTION INTRAVENOUS at 22:10

## 2017-09-28 RX ADMIN — HEPARIN SODIUM SCH UNITS: 5000 INJECTION INTRAVENOUS; SUBCUTANEOUS at 06:36

## 2017-09-28 RX ADMIN — INSULIN LISPRO SCH UNITS: 100 INJECTION, SOLUTION INTRAVENOUS; SUBCUTANEOUS at 21:31

## 2017-09-28 RX ADMIN — INSULIN LISPRO SCH UNITS: 100 INJECTION, SOLUTION INTRAVENOUS; SUBCUTANEOUS at 17:50

## 2017-09-28 RX ADMIN — SENNOSIDES SCH TAB: 8.6 TABLET, FILM COATED ORAL at 20:50

## 2017-09-28 RX ADMIN — ACETAMINOPHEN SCH MG: 325 TABLET ORAL at 20:49

## 2017-09-28 RX ADMIN — DOCUSATE SODIUM SCH MG: 100 CAPSULE, LIQUID FILLED ORAL at 07:58

## 2017-09-28 RX ADMIN — LEVOFLOXACIN SCH MLS/HR: 5 INJECTION, SOLUTION INTRAVENOUS at 16:31

## 2017-09-28 RX ADMIN — INSULIN LISPRO SCH UNITS: 100 INJECTION, SOLUTION INTRAVENOUS; SUBCUTANEOUS at 09:47

## 2017-09-28 NOTE — PN
Subjective


Date of Service: 09/28/17


Interval History: 





Patient seen and examined at bedside.  Patient reports pain controlled.  

Patient making progress with PT.  Is asking for the mac to be removed.


Family History: Unchanged from Admission


Social History: Unchanged from Admission


Past Medical History: Unchanged from Admission





Objective


Active Medications: 








Acetaminophen (Tylenol Tab*)  975 mg PO TID [facility]


Allopurinol (Zyloprim Tab*)  100 mg PO DAILY [facility]


Amlodipine Besylate (Norvasc Tab*)  2.5 mg PO DAILY [facility]


Atenolol (Tenormin Tab*)  25 mg PO DAILY [facility]


Atorvastatin Calcium (Lipitor*)  10 mg PO DAILY [facility]


Calcitriol (Rocaltrol  Cap*)  0.25 mcg PO DAILY [facility]


Dextrose (D50w Syringe 50 Ml*)  12.5 gm IV PUSH .FOR FS < 60 - SS PRN


Docusate Sodium (Colace Cap*)  100 mg PO BID [facility]


Famotidine (Pepcid Tab*)  20 mg PO DAILY [facility]


Heparin Sodium (Porcine) (Heparin Vial(*))  5,000 units SUBCUT Q8HR [facility]


Levofloxacin/Dextrose (Levaquin 250 Mg Ivpremx(*))  250 mg in 50 mls @ 50 mls/

hr IVPB Q48H [facility]


Insulin Human Lispro (Humalog*)  0 units SUBCUT ACHS [facility]


Levothyroxine Sodium (Synthroid Tab*)  75 mcg PO 0600 [facility]


Lidocaine (Lidoderm 5% Patch*)  1 patch TRANSDERM 0900 [facility]


Lisinopril (Prinivil Tab*)  20 mg PO DAILY [facility]


Morphine Sulfate (Ms Contin(*))  15 mg PO Q12H [facility]


Morphine Sulfate (Morphine Oral.Soln 10 Mg*)  5 mg PO Q4H PRN


Nystatin (Nystatin Top Powder*)  1 applic TOPICAL TID PRN


Ondansetron HCl (Zofran Inj*)  4 mg IV Q6H PRN


Pharmacy Profile Note (Lidocaine Patch Remove*)  1 note PATCH OFF 2100 [facility]


Senna (Senokot Tab*)  1 tab PO BID [facility]

















  09/28/17





  13:19


 


Temperature 97.4 F


 


Pulse Rate 50


 


Respiratory 20





Rate 


 


Blood Pressure 165/48





(mmHg) 


 


O2 Sat by Pulse 96





Oximetry 











Oxygen Devices in Use Now: Nasal Cannula - 3L


Appearance: sitting up in bed, NAD


Eyes: No Scleral Icterus, PERRLA


Ears/Nose/Mouth/Throat: NL Teeth, Lips, Gums


Neck: NL Appearance and Movements; NL JVP


Respiratory: Symmetrical Chest Expansion and Respiratory Effort, Clear to 

Auscultation


Cardiovascular: NL Sounds; No Murmurs; No JVD, RRR


Abdominal: NL Sounds; No Tenderness; No Distention


Extremities: No Edema


Skin: No Rash or Ulcers


Neurological: Alert and Oriented x 3, NL Muscle Strength and Tone


Lines/Tubes/Other Access: Clean, Dry and Intact Peripheral IV


Result Diagrams: 


 09/28/17 05:20





 09/28/17 05:25





Assess/Plan/Problems-Billing





Mechanical fall and subsequent rib fxs in a 75 yo F with hx of HTN, HLD, obesity

, O2 dependent COPD and DM.








- Patient Problems


(1) Rib fractures


Comment: Pain improved with MS Contin and PO PRN Morphine.  Continue 5mg PRN.  

She continues to pull 1.5L on IS.  Continue Lidoderm patch and ATC Tylenol. 

Oxygen down to 2L (baseline). Completing a 5 day course of Levaquin for empiric 

tx of PNA given severity of rib fractures and fever on 9/23.   





(2) Fever


Comment: The patient had a low grade fever on 9/23/17. WBC trended down then 

was 23k yesterday.  Today it is back to normal.  Continue renally dosed 

Lervaquin for empiric treatment of PNA.   





(3) Acute kidney injury superimposed on CKD


Comment: The patient has baseline stage III CKD with a baseline creatinine 

around 1.8 who developed acute kidney failure while in the hospital-? secondary 

to poor oral intake. Her creatinine peaked at 3.34 and is now close to 

baseline. Recheck in AM.   





(4) COPD (chronic obstructive pulmonary disease)


Comment: No signs of exacerbation at this time. She has COPD and chronic 

hypoxic respiratory failure requiring continous O2 supplementation at 2L.    





(5) Diabetes


Comment: Sugars slightly up in 200s. Restart Lantus at lower dose.  Hold 

glipzide and metformin (should not be restarted due to renal function). 

Continue Lispro sliding scale.   





(6) HTN (hypertension)


Comment: BP is under fair control on amlodipine and atenolol. 1/2 Lisinopril 

restarted this morning.   





(7) DVT prophylaxis


Comment: SQ heparin   





(8) Full code status





Status and Disposition: 





Inpatient for continued pain control, renal failure. 2 person assist so will 

need short term rehab.  DC planners aware.

## 2017-09-29 LAB
ADD DIFF/SLIDE REVIEW?: (no result)
ANION GAP SERPL CALC-SCNC: 12 MMOL/L (ref 2–11)
BUN SERPL-MCNC: 81 MG/DL (ref 6–24)
BUN/CREAT SERPL: 43.3 (ref 8–20)
CALCIUM SERPL-MCNC: 10 MG/DL (ref 8.6–10.3)
CHLORIDE SERPL-SCNC: 105 MMOL/L (ref 101–111)
GLUCOSE SERPL-MCNC: 178 MG/DL (ref 70–100)
HCO3 SERPL-SCNC: 20 MMOL/L (ref 22–32)
HCT VFR BLD AUTO: 31 % (ref 35–47)
HGB BLD-MCNC: 9.9 G/DL (ref 12–16)
MCH RBC QN AUTO: 33 PG (ref 27–31)
MCHC RBC AUTO-ENTMCNC: 32 G/DL (ref 31–36)
MCV RBC AUTO: 101 FL (ref 80–97)
POTASSIUM SERPL-SCNC: 4.5 MMOL/L (ref 3.5–5)
RBC # BLD AUTO: 3.03 10^6/UL (ref 4–5.4)
SODIUM SERPL-SCNC: 137 MMOL/L (ref 133–145)
WBC # BLD AUTO: 12.4 10^3/UL (ref 3.5–10.8)

## 2017-09-29 RX ADMIN — ACETAMINOPHEN SCH MG: 325 TABLET ORAL at 08:50

## 2017-09-29 RX ADMIN — LEVOTHYROXINE SODIUM SCH MCG: 75 TABLET ORAL at 04:47

## 2017-09-29 RX ADMIN — INSULIN LISPRO SCH UNITS: 100 INJECTION, SOLUTION INTRAVENOUS; SUBCUTANEOUS at 08:57

## 2017-09-29 RX ADMIN — INSULIN LISPRO SCH UNITS: 100 INJECTION, SOLUTION INTRAVENOUS; SUBCUTANEOUS at 12:59

## 2017-09-29 RX ADMIN — LISINOPRIL SCH MG: 10 TABLET ORAL at 08:52

## 2017-09-29 RX ADMIN — ACETAMINOPHEN SCH MG: 325 TABLET ORAL at 20:48

## 2017-09-29 RX ADMIN — WATER SCH NOTE: 100 INJECTION, SOLUTION INTRAVENOUS at 20:56

## 2017-09-29 RX ADMIN — HEPARIN SODIUM SCH UNITS: 5000 INJECTION INTRAVENOUS; SUBCUTANEOUS at 20:48

## 2017-09-29 RX ADMIN — AMLODIPINE BESYLATE SCH MG: 5 TABLET ORAL at 08:55

## 2017-09-29 RX ADMIN — HEPARIN SODIUM SCH UNITS: 5000 INJECTION INTRAVENOUS; SUBCUTANEOUS at 14:16

## 2017-09-29 RX ADMIN — ALLOPURINOL SCH MG: 100 TABLET ORAL at 12:57

## 2017-09-29 RX ADMIN — ATORVASTATIN CALCIUM SCH MG: 10 TABLET, FILM COATED ORAL at 08:53

## 2017-09-29 RX ADMIN — SENNOSIDES SCH TAB: 8.6 TABLET, FILM COATED ORAL at 08:54

## 2017-09-29 RX ADMIN — DOCUSATE SODIUM SCH MG: 100 CAPSULE, LIQUID FILLED ORAL at 08:55

## 2017-09-29 RX ADMIN — DOCUSATE SODIUM SCH MG: 100 CAPSULE, LIQUID FILLED ORAL at 20:48

## 2017-09-29 RX ADMIN — HEPARIN SODIUM SCH UNITS: 5000 INJECTION INTRAVENOUS; SUBCUTANEOUS at 05:09

## 2017-09-29 RX ADMIN — ACETAMINOPHEN SCH MG: 325 TABLET ORAL at 14:15

## 2017-09-29 RX ADMIN — INSULIN LISPRO SCH UNITS: 100 INJECTION, SOLUTION INTRAVENOUS; SUBCUTANEOUS at 17:07

## 2017-09-29 RX ADMIN — CALCITRIOL SCH MCG: 0.25 CAPSULE ORAL at 12:58

## 2017-09-29 RX ADMIN — SENNOSIDES SCH TAB: 8.6 TABLET, FILM COATED ORAL at 20:48

## 2017-09-29 RX ADMIN — ATENOLOL SCH MG: 25 TABLET ORAL at 08:53

## 2017-09-29 RX ADMIN — INSULIN LISPRO SCH UNITS: 100 INJECTION, SOLUTION INTRAVENOUS; SUBCUTANEOUS at 20:48

## 2017-09-29 RX ADMIN — LIDOCAINE SCH PATCH: 50 PATCH CUTANEOUS at 08:58

## 2017-09-29 NOTE — PN
Subjective


Date of Service: 09/29/17


Interval History: 





Patient seen and examined at bedside.  Patient was very confused and agitated 

overnight.  She received Haldol x2 overnight.  Throughout the course of the day 

she has improved.  Denies SOB and still on 2L which is baseline.


Family History: Unchanged from Admission


Social History: Unchanged from Admission


Past Medical History: Unchanged from Admission





Objective


Active Medications: 








Acetaminophen (Tylenol Tab*)  975 mg PO TID MARCUS


Allopurinol (Zyloprim Tab*)  100 mg PO DAILY [facility]


Amlodipine Besylate (Norvasc Tab*)  2.5 mg PO DAILY [facility]


Atenolol (Tenormin Tab*)  25 mg PO DAILY [facility]


Atorvastatin Calcium (Lipitor*)  10 mg PO DAILY [facility]


Calcitriol (Rocaltrol  Cap*)  0.25 mcg PO DAILY [facility]


Docusate Sodium (Colace Cap*)  100 mg PO BID MARCUS


Famotidine (Pepcid Tab*)  20 mg PO DAILY PRN


Heparin Sodium (Porcine) (Heparin Vial(*))  5,000 units SUBCUT Q8HR MARCUS


Levofloxacin/Dextrose (Levaquin 250 Mg Ivpremx(*))  250 mg in 50 mls @ 50 mls/

hr IVPB Q48H MARCUS


Insulin Glargine (Lantus(*))  7 units SUBCUT BEDTIME [facility]


Insulin Human Lispro (Humalog*)  0 units SUBCUT ACHS [facility]


Levothyroxine Sodium (Synthroid Tab*)  75 mcg PO 0600 [facility]


Lidocaine (Lidoderm 5% Patch*)  1 patch TRANSDERM 0900 [facility]


Lisinopril (Prinivil Tab*)  20 mg PO DAILY [facility]


Nystatin (Nystatin Top Powder*)  1 applic TOPICAL TID PRN


Ondansetron HCl (Zofran Inj*)  4 mg IV Q6H PRN


Oxycodone HCl (Roxycodone Tab*)  5 mg PO Q4H PRN


Pharmacy Profile Note (Lidocaine Patch Remove*)  1 note PATCH OFF 2100 [facility]


Quetiapine Fumarate (Seroquel Tab*)  12.5 mg PO BEDTIME PRN


Senna (Senokot Tab*)  1 tab PO BID [facility]























  09/29/17 09/29/17





  08:00 11:47


 


Temperature 97.6 F 98.1 F


 


Pulse Rate 70 60


 


Respiratory 20 18





Rate  


 


Blood Pressure 131/79 156/51





(mmHg)  


 


O2 Sat by Pulse 98 97





Oximetry  











Oxygen Devices in Use Now: Nasal Cannula - 3L


Appearance: sitting up in chair, NAD


Eyes: No Scleral Icterus, PERRLA


Ears/Nose/Mouth/Throat: NL Teeth, Lips, Gums, Mucous Membranes Moist


Neck: NL Appearance and Movements; NL JVP


Respiratory: Symmetrical Chest Expansion and Respiratory Effort, Clear to 

Auscultation


Cardiovascular: NL Sounds; No Murmurs; No JVD


Abdominal: NL Sounds; No Tenderness; No Distention


Extremities: No Edema


Skin: No Rash or Ulcers


Neurological: NL Muscle Strength and Tone, - - oriented to self only


Lines/Tubes/Other Access: Clean, Dry and Intact Peripheral IV


Result Diagrams: 


 09/29/17 05:23





 09/29/17 05:23





Assess/Plan/Problems-Billing





Mechanical fall and subsequent rib fxs in a 75 yo F with hx of HTN, HLD, obesity

, O2 dependent COPD and DM.








- Patient Problems


(1) Delirium


Comment: Multifactorial.  This is a combination of her baseline dementia, 

morphine, and being in the hospital.  Will start low dose seroquel at bedtime 

to help.   





(2) Rib fractures


Comment: Due to confusion MS Contin d/c yesterday and PRN low dose oxycodone 

started. She continues to pull 1.5L on IS.  Continue Lidoderm patch and ATC 

Tylenol. Oxygen down to 2L (baseline). Will complete a 5 day course of Levaquin 

today.   





(3) Fever


Comment: The patient had a low grade fever on 9/23/17. WBC trended down then 

was 23k yesterday.  Today it is back to normal.  Continue renally dosed 

Lervaquin for empiric treatment of PNA.   





(4) Acute kidney injury superimposed on CKD


Comment: Creatinine is now at baseline (1.8).  Low dose Lisinopril started.   





(5) COPD (chronic obstructive pulmonary disease)


Comment: No signs of exacerbation at this time. She has COPD and chronic 

hypoxic respiratory failure requiring continous O2 supplementation at 2L.    





(6) Diabetes


Comment: Sugars slightly up in 200s. Increase Lantus to 15 units at bedtime..  

Hold glipzide and metformin (should not be restarted due to renal function). 

Continue Lispro sliding scale.   





(7) HTN (hypertension)


Comment: BP is under fair control on amlodipine and atenolol. Increase 

Lisinopril to home dose.   





(8) DVT prophylaxis


Comment: SQ heparin   





(9) Full code status





Status and Disposition: 





Inpatient for continued pain control, renal failure. When delirium resolves 

plan for STR.  Possibly [facility] on Monday.

## 2017-09-29 NOTE — ED
Rashawn MISHRA Alfonso scribed for Dk Fry MD on 09/21/17 at 1554 .





Adult Trauma





- HPI Summary


HPI Summary: 


 This patient is a 76 year old F BIBA to [facility] accompanied by family s/p a fall 

at 0945 today. She fell in the bathroom while pulling up her pants. The patient 

rates the pain 10/10 in severity. Symptoms aggravated by deep breaths. Symptoms 

alleviated by nothing. Patient reports left rib pain, and left shoulder pain. 

Patient denies LOC, head trauma, and neck pain. She is on NC O2 at home and 

currently. PMHx includes DM.





- History of Current Complaint


Chief Complaint: EDGeneral


Stated Complaint: LT RIB PAIN


Time Seen by Provider: 09/21/17 14:42


Hx Obtained From: Patient


Mechanism of Injury: Fall


Loss of Consciousness: no loss of consciousness


Onset/Duration: Started Hours Ago - 0945 today, Traumatic, Still Present


Onset of Pain: Post Accident


Current Severity: Severe


Pain Intensity: 10


Pain Scale Used: 0-10 Numeric


Aggravating Factor(s): Deep Breaths


Alleviating Factor(s): Nothing


Associated Signs & Symptoms: Positive: Other: - left rib pain, and left 

shoulder pain. Patient denies LOC, head trauma, and neck pain.





- Allergy/Home Medications


Allergies/Adverse Reactions: 


 Allergies











Allergy/AdvReac Type Severity Reaction Status Date / Time


 


Cephalexin [From Keflex] Allergy  Unknown Verified 09/21/17 14:57





   Reaction  





   Details  


 


CI Pigment Blue 63 Allergy  Unknown Verified 09/21/17 14:57





[From Cymbalta]   Reaction  





   Details  


 


Duloxetine [From Cymbalta] Allergy  Unknown Verified 09/21/17 14:57





   Reaction  





   Details  


 


Penicillins Allergy  Unknown Verified 09/21/17 14:57





   Reaction  





   Details  


 


Sulfamethoxazole Allergy  Unknown Verified 09/21/17 14:57





w/Trimethoprim   Reaction  





[From Bactrim]   Details  


 


grantrisin Allergy  Unknown Uncoded 09/21/17 14:57





   Reaction  





   Details  














PMH/Surg Hx/FS Hx/Imm Hx


Endocrine/Hematology History: Reports: Hx Diabetes, Hx Thyroid Disease


Sensory History: 


   Denies: Hx Deafness


Opthamlomology History: 


   Denies: Hx Legally Blind


EENT History: 


   Denies: Hx Deafness


Infectious Disease History: No


Infectious Disease History: 


   Denies: Traveled Outside the [place] in Last 30 Days





- Family History


Known Family History: Positive: Diabetes





- Social History


Alcohol Use: None


Substance Use Type: Reports: None


Smoking Status (MU): Never Smoked Tobacco





Review of Systems


Negative: Fever, Chills


Negative: Erythema


Negative: Sore Throat


Negative: Chest Pain


Negative: Shortness Of Breath, Cough


Negative: Abdominal Pain, Diarrhea, Nausea


Negative: dysuria, hematuria


Positive: Other - Fall, left rip pain, left shoulder pain; negative head trauma

, neck pain.  Negative: Edema


Negative: Rash


Neurological: Other - Negative dizziness and LOC


All Other Systems Reviewed And Are Negative: Yes





Physical Exam


Triage Information Reviewed: Yes


Vital Signs On Initial Exam: 


 Initial Vitals











BP


 


 179/53 


 


 09/21/17 14:33











Vital Signs Reviewed: Yes


Appearance: Positive: Well-Appearing, No Pain Distress


Skin: Positive: Warm, Dry


Head/Face: Positive: Normal Head/Face Inspection


Eyes: Positive: Conjunctiva Clear


Neck: Positive: Other: - Musculoskeletal ROM normal neck. (-) JVD, (-) Stridor, 

(-) Tracheal deviation, (-) Cervical adenopathy


Respiratory/Lung Sounds: Positive: Other - Diminished breath sounds. Effort 

normal. (-) Respiratory distress, (-) Wheezes, (-) Rales


Cardiovascular: Positive: RRR, Other - Heart sounds normal; Intact distal pulses

; The pedal pulses are 2+ and symmetric. Radial pulses are 2+ and symmetric. (-

) Murmur


Abdomen Description: Positive: Nontender, Soft, Other: - No rebound.  Negative: 

Distended, Guarding


Musculoskeletal: Positive: Other - Ecchymosis at the left lateral 8th rib which 

is tender to palpation. Left shoulder pain with ROM..  Negative: Edema Left, 

Edema Right


Neurological: Positive: Alert, Oriented to Person Place, Time


Psychiatric: Positive: Affect/Mood Appropriate





- [place] Coma Scale


Coma Scale Total: 15





Diagnostics





- Vital Signs


 Vital Signs











  Temp Pulse Resp BP Pulse Ox


 


 09/21/17 14:51  98.2 F  56  20  179/53  93


 


 09/21/17 14:35   57    87


 


 09/21/17 14:33     179/53 














- Laboratory


Result Diagrams: 


 09/21/17 21:34





 09/21/17 21:34


Lab Statement: Any lab studies that have been ordered have been reviewed, and 

results considered in the medical decision making process.





- Radiology


  ** Ribs w/ chest X-ray 


Radiology Interpretation Completed By: Radiologist - NO DISPLACED RIB FRACTURE 

OR PNEUMOTHORAX. CLINICALLY. BIBASILAR ATELECTASIS VERSUS CONSOLIDATION. ED 

physician has reviewed this radiology report and agrees.





  ** Shoulder X-Ray


Radiology Interpretation Completed By: Radiologist - 1. Advanced osteoarthritis 

at the acromioclavicular joint with progression. 2. Less marked osteoarthritis 

at the glenohumeral joint. 3. Stigmata of calcific tendinopathy. 4. Negative 

for fracture. ED physician has reviewed this radiology report and agrees.





- CT


  ** Abd/Chest


CT Interpretation Completed By: Radiologist - Pending official interpretation 

from radiologist. See [facility].





Re-Evaluation





- Re-Evaluation


  ** First Eval


Re-Evaluation Time: 17:45


Comment: No relief of pain after morphine





  ** Second Eval


Re-Evaluation Time: 21:20


Comment: Attempted to ambulate the patient and she is in increasing pain. The 

ecchymosis has spread, now involving 5 ribs and is about 8 inches in diameter. 

She appears to be SOB.





Adult Trauma Course/Dx





- Course


Assessment/Plan: This patient is a 76 year [facility] F [facility] [facility] [place] 

[place] s/p a fall at 0945 today. She fell in the bathroom while pulling up her 

pants. The patient rates the pain 10/10 in severity. Symptoms aggravated by 

deep breaths. Symptoms alleviated by nothing. Patient reports left rib pain, 

and left shoulder pain. Patient denies LOC, head trauma, and neck pain. She is 

on NC O2 at home and currently. PMHx includes DM. Ribs w/ chest X-ray reveals 

NO DISPLACED RIB FRACTURE OR PNEUMOTHORAX. CLINICALLY. BIBASILAR ATELECTASIS 

VERSUS CONSOLIDATION. ED physician has reviewed this radiology report and 

agrees. Shoulder X-ray reveals 1. Advanced osteoarthritis at the 

acromioclavicular joint with progression. 2. Less marked osteoarthritis at the 

glenohumeral joint.3. Stigmata of calcific tendinopathy. 4. Negative for 

fracture. ED physician has reviewed this radiology report and agrees. CT abd/

chest Pending official interpretation from radiologist. Patient is signed out 

to Dr. Charles, pending disposition, awaiting CT.





- Diagnoses


Provider Diagnoses: 


 Suspected fracture of rib








Discharge





- Discharge Plan


Condition: Stable


Disposition: OTHER


Discharge Disposition Comment: Patient is signed out to Dr. Charles, pending 

disposition, awaiting CT.





The documentation as recorded by the Rashawn lewis,Casey accurately reflects 

the service I personally performed and the decisions made by me, Dk Fry MD.

## 2017-09-30 RX ADMIN — NYSTATIN PRN APPLIC: 100000 POWDER TOPICAL at 05:36

## 2017-09-30 RX ADMIN — HEPARIN SODIUM SCH UNITS: 5000 INJECTION INTRAVENOUS; SUBCUTANEOUS at 21:58

## 2017-09-30 RX ADMIN — ATENOLOL SCH MG: 50 TABLET ORAL at 09:03

## 2017-09-30 RX ADMIN — ALLOPURINOL SCH MG: 100 TABLET ORAL at 09:06

## 2017-09-30 RX ADMIN — INSULIN LISPRO SCH UNITS: 100 INJECTION, SOLUTION INTRAVENOUS; SUBCUTANEOUS at 09:06

## 2017-09-30 RX ADMIN — SENNOSIDES SCH TAB: 8.6 TABLET, FILM COATED ORAL at 22:02

## 2017-09-30 RX ADMIN — HEPARIN SODIUM SCH UNITS: 5000 INJECTION INTRAVENOUS; SUBCUTANEOUS at 12:52

## 2017-09-30 RX ADMIN — SENNOSIDES SCH TAB: 8.6 TABLET, FILM COATED ORAL at 09:03

## 2017-09-30 RX ADMIN — ATORVASTATIN CALCIUM SCH MG: 10 TABLET, FILM COATED ORAL at 09:05

## 2017-09-30 RX ADMIN — DOCUSATE SODIUM SCH MG: 100 CAPSULE, LIQUID FILLED ORAL at 22:02

## 2017-09-30 RX ADMIN — INSULIN LISPRO SCH UNITS: 100 INJECTION, SOLUTION INTRAVENOUS; SUBCUTANEOUS at 12:43

## 2017-09-30 RX ADMIN — CALCITRIOL SCH MCG: 0.25 CAPSULE ORAL at 09:07

## 2017-09-30 RX ADMIN — DOCUSATE SODIUM SCH MG: 100 CAPSULE, LIQUID FILLED ORAL at 09:04

## 2017-09-30 RX ADMIN — LISINOPRIL SCH MG: 10 TABLET ORAL at 09:05

## 2017-09-30 RX ADMIN — ACETAMINOPHEN SCH MG: 325 TABLET ORAL at 22:02

## 2017-09-30 RX ADMIN — AMLODIPINE BESYLATE SCH MG: 5 TABLET ORAL at 09:05

## 2017-09-30 RX ADMIN — WATER SCH NOTE: 100 INJECTION, SOLUTION INTRAVENOUS at 22:02

## 2017-09-30 RX ADMIN — INSULIN GLARGINE SCH UNIT: 100 INJECTION, SOLUTION SUBCUTANEOUS at 22:00

## 2017-09-30 RX ADMIN — QUETIAPINE PRN MG: 25 TABLET, FILM COATED ORAL at 22:55

## 2017-09-30 RX ADMIN — HEPARIN SODIUM SCH UNITS: 5000 INJECTION INTRAVENOUS; SUBCUTANEOUS at 05:32

## 2017-09-30 RX ADMIN — LEVOTHYROXINE SODIUM SCH MCG: 75 TABLET ORAL at 05:32

## 2017-09-30 RX ADMIN — INSULIN LISPRO SCH UNITS: 100 INJECTION, SOLUTION INTRAVENOUS; SUBCUTANEOUS at 21:59

## 2017-09-30 RX ADMIN — QUETIAPINE PRN MG: 25 TABLET, FILM COATED ORAL at 00:44

## 2017-09-30 RX ADMIN — ACETAMINOPHEN SCH MG: 325 TABLET ORAL at 09:03

## 2017-09-30 RX ADMIN — INSULIN LISPRO SCH UNITS: 100 INJECTION, SOLUTION INTRAVENOUS; SUBCUTANEOUS at 17:20

## 2017-09-30 RX ADMIN — ACETAMINOPHEN SCH MG: 325 TABLET ORAL at 12:53

## 2017-09-30 RX ADMIN — LIDOCAINE SCH PATCH: 50 PATCH CUTANEOUS at 09:02

## 2017-09-30 NOTE — PN
Subjective


Date of Service: 09/30/17


Interval History: 





No overnight events; slept better with qhs seroquel and did not receive any 

additional medications overnight.  She reports ongoing pain today in her left 

ribs, but has been using the incentive spirometer.  Denies shortness of breath, 

cough, fevers, diarrhea, constipation, nausea, vomiting.  Remaining ROS 

negative. 


Family History: Unchanged from Admission


Social History: Unchanged from Admission


Past Medical History: Unchanged from Admission





Objective


Active Medications: 








Acetaminophen (Tylenol Tab*)  975 mg PO TID [facility]


   Last Admin: 09/30/17 12:53 Dose:  975 mg


Allopurinol (Zyloprim Tab*)  100 mg PO DAILY [facility]


   Last Admin: 09/30/17 09:06 Dose:  100 mg


Amlodipine Besylate (Norvasc Tab*)  2.5 mg PO DAILY [facility]


   Last Admin: 09/30/17 09:05 Dose:  2.5 mg


Atenolol (Tenormin Tab*)  50 mg PO DAILY [facility]


   Last Admin: 09/30/17 09:03 Dose:  50 mg


Atorvastatin Calcium (Lipitor*)  10 mg PO DAILY [facility]


   Last Admin: 09/30/17 09:05 Dose:  10 mg


Calcitriol (Rocaltrol  Cap*)  0.25 mcg PO DAILY [facility]


   Last Admin: 09/30/17 09:07 Dose:  0.25 mcg


Dextrose (D50w Syringe 50 Ml*)  12.5 gm IV PUSH .FOR FS < 60 - SS PRN


   PRN Reason: FS < 60


Docusate Sodium (Colace Cap*)  100 mg PO BID [facility]


   Last Admin: 09/30/17 09:04 Dose:  100 mg


Famotidine (Pepcid Tab*)  20 mg PO DAILY PRN


   PRN Reason: HEARTBURN


Heparin Sodium (Porcine) (Heparin Vial(*))  5,000 units SUBCUT Q8HR [facility]


   Last Admin: 09/30/17 12:52 Dose:  5,000 units


Insulin Glargine (Lantus(*))  15 units SUBCUT BEDTIME [facility]


   Last Admin: 09/29/17 20:49 Dose:  15 units


Insulin Human Lispro (Humalog*)  0 units SUBCUT ACHS [facility]


   PRN Reason: Protocol


   Last Admin: 09/30/17 12:43 Dose:  2 units


Levofloxacin (Levaquin Tab*)  250 mg PO Q24H [facility]


   Last Admin: 09/30/17 12:49 Dose:  250 mg


Levothyroxine Sodium (Synthroid Tab*)  75 mcg PO 0600 [facility]


   Last Admin: 09/30/17 05:32 Dose:  75 mcg


Lidocaine (Lidoderm 5% Patch*)  1 patch TRANSDERM 0900 [facility]


   Last Admin: 09/30/17 09:02 Dose:  1 patch


Lisinopril (Prinivil Tab*)  20 mg PO DAILY [facility]


   Last Admin: 09/30/17 09:05 Dose:  20 mg


Nystatin (Nystatin Top Powder*)  1 applic TOPICAL TID PRN


   PRN Reason: RASH


   Last Admin: 09/30/17 05:36 Dose:  1 applic


Ondansetron HCl (Zofran Inj*)  4 mg IV Q6H PRN


   PRN Reason: NAUSEA


   Last Admin: 09/25/17 09:01 Dose:  4 mg


Oxycodone HCl (Roxycodone Tab*)  2.5 mg PO Q4H PRN


   PRN Reason: PAIN


Pharmacy Profile Note (Lidocaine Patch Remove*)  1 note PATCH OFF 2100 [facility]


   Last Admin: 09/29/17 20:56 Dose:  1 note


Quetiapine Fumarate (Seroquel Tab*)  12.5 mg PO BEDTIME PRN


   PRN Reason: AGITATION


   Last Admin: 09/30/17 00:44 Dose:  12.5 mg


Senna (Senokot Tab*)  1 tab PO BID [facility]


   Last Admin: 09/30/17 09:03 Dose:  1 tab








 Vital Signs











  09/29/17 09/29/17 09/29/17





  15:34 20:00 23:26


 


Temperature 97.7 F  98.2 F


 


Pulse Rate 75  65


 


Respiratory 22 20 24





Rate   


 


Blood Pressure 178/55  





(mmHg)   


 


O2 Sat by Pulse 98  99





Oximetry   














  09/30/17 09/30/17





  08:00 08:18


 


Temperature  98.1 F


 


Pulse Rate  61


 


Respiratory 18 17





Rate  


 


Blood Pressure  177/50





(mmHg)  


 


O2 Sat by Pulse 96 96





Oximetry  











Oxygen Devices in Use Now: Nasal Cannula - 3L


Appearance: alert, no distress, distracted, poor attention


Eyes: No Scleral Icterus, PERRLA


Ears/Nose/Mouth/Throat: NL Teeth, Lips, Gums, Clear Oropharnyx


Neck: NL Appearance and Movements; NL JVP, Trachea Midline


Respiratory: Symmetrical Chest Expansion and Respiratory Effort, Clear to 

Auscultation


Cardiovascular: NL Sounds; No Murmurs; No JVD, RRR


Abdominal: NL Sounds; No Tenderness; No Distention, No Hepatosplenomegaly


Lymphatic: No Cervical Adenopathy


Extremities: No Edema


Skin: - - echymoses over lower back and left side 


Result Diagrams: 


 09/29/17 05:23





 09/29/17 05:23





Assess/Plan/Problems-Billing





Mechanical fall and subsequent rib fxs in a 77 yo F with hx of HTN, HLD, obesity

, O2 dependent COPD and DM.





1. Delirium


Agree, likely multifactorial related to narcotics, hospitalization, and pain on 

underlying dementia.  Improved today after sleep overnight and seroquel.  





2. Rib fractures 


Pain now controlled, continue incentive spirometry.  Continue pain control with 

standing tylenol and PRN oxycodone.  Yesterday, levaquin was started 

empirically in the setting of poor inspiratory effort and fever.  Day 2 of 5 of 

levaquin. 





3. Chronic hypoxic respiratory failure. 


stable on home o2 requirement of 2L 





4. DM


BG remains poorly controlled off metformin and glipizide.  Increase lantus to 

18U nightly and continue to hold metformin and glipizide in setting of CKD. 





5. CKD


At baseline; continue ACE inhibitor 





6. Dispo--awaiting CR transfer. Medically stable. 


Status and Disposition: 





Inpatient for continued pain control, renal failure. When delirium resolves 

plan for STR.  Possibly [facility] on Monday.

## 2017-10-01 RX ADMIN — AMLODIPINE BESYLATE SCH MG: 5 TABLET ORAL at 08:21

## 2017-10-01 RX ADMIN — HEPARIN SODIUM SCH UNITS: 5000 INJECTION INTRAVENOUS; SUBCUTANEOUS at 13:03

## 2017-10-01 RX ADMIN — ACETAMINOPHEN SCH MG: 325 TABLET ORAL at 13:03

## 2017-10-01 RX ADMIN — INSULIN LISPRO SCH UNITS: 100 INJECTION, SOLUTION INTRAVENOUS; SUBCUTANEOUS at 13:02

## 2017-10-01 RX ADMIN — HYDRALAZINE HYDROCHLORIDE PRN MG: 20 INJECTION INTRAMUSCULAR; INTRAVENOUS at 17:05

## 2017-10-01 RX ADMIN — DOCUSATE SODIUM SCH MG: 100 CAPSULE, LIQUID FILLED ORAL at 21:40

## 2017-10-01 RX ADMIN — LISINOPRIL SCH MG: 10 TABLET ORAL at 08:20

## 2017-10-01 RX ADMIN — ATENOLOL SCH MG: 50 TABLET ORAL at 08:21

## 2017-10-01 RX ADMIN — HEPARIN SODIUM SCH UNITS: 5000 INJECTION INTRAVENOUS; SUBCUTANEOUS at 06:07

## 2017-10-01 RX ADMIN — LIDOCAINE SCH PATCH: 50 PATCH CUTANEOUS at 08:18

## 2017-10-01 RX ADMIN — INSULIN GLARGINE SCH UNIT: 100 INJECTION, SOLUTION SUBCUTANEOUS at 21:41

## 2017-10-01 RX ADMIN — CALCITRIOL SCH MCG: 0.25 CAPSULE ORAL at 08:21

## 2017-10-01 RX ADMIN — INSULIN LISPRO SCH UNITS: 100 INJECTION, SOLUTION INTRAVENOUS; SUBCUTANEOUS at 08:17

## 2017-10-01 RX ADMIN — INSULIN LISPRO SCH UNITS: 100 INJECTION, SOLUTION INTRAVENOUS; SUBCUTANEOUS at 21:41

## 2017-10-01 RX ADMIN — ATORVASTATIN CALCIUM SCH MG: 10 TABLET, FILM COATED ORAL at 08:19

## 2017-10-01 RX ADMIN — CHLORTHALIDONE SCH MG: 50 TABLET ORAL at 15:02

## 2017-10-01 RX ADMIN — INSULIN LISPRO SCH UNITS: 100 INJECTION, SOLUTION INTRAVENOUS; SUBCUTANEOUS at 16:57

## 2017-10-01 RX ADMIN — SENNOSIDES SCH TAB: 8.6 TABLET, FILM COATED ORAL at 08:19

## 2017-10-01 RX ADMIN — DOCUSATE SODIUM SCH MG: 100 CAPSULE, LIQUID FILLED ORAL at 08:21

## 2017-10-01 RX ADMIN — HEPARIN SODIUM SCH UNITS: 5000 INJECTION INTRAVENOUS; SUBCUTANEOUS at 21:42

## 2017-10-01 RX ADMIN — SENNOSIDES SCH TAB: 8.6 TABLET, FILM COATED ORAL at 21:40

## 2017-10-01 RX ADMIN — LEVOTHYROXINE SODIUM SCH MCG: 75 TABLET ORAL at 06:07

## 2017-10-01 RX ADMIN — ALLOPURINOL SCH MG: 100 TABLET ORAL at 08:19

## 2017-10-01 RX ADMIN — ACETAMINOPHEN SCH MG: 325 TABLET ORAL at 08:19

## 2017-10-01 RX ADMIN — WATER SCH: 100 INJECTION, SOLUTION INTRAVENOUS at 21:42

## 2017-10-01 RX ADMIN — ACETAMINOPHEN SCH MG: 325 TABLET ORAL at 21:40

## 2017-10-01 NOTE — PN
Subjective


Date of Service: 10/01/17


Interval History: 





Patient seen this morning. Recalled me from earlier this admission. Reports 

pain is better controlled. No SOB. 


Family History: Unchanged from Admission


Social History: Unchanged from Admission


Past Medical History: Unchanged from Admission





Objective


Active Medications: 








Acetaminophen (Tylenol Tab*)  975 mg PO TID [facility]ARCUS


Allopurinol (Zyloprim Tab*)  100 mg PO DAILY [facility]


Amlodipine Besylate (Norvasc Tab*)  2.5 mg PO DAILY [facility]


Atenolol (Tenormin Tab*)  50 mg PO DAILY [facility]


Atorvastatin Calcium (Lipitor*)  10 mg PO DAILY [facility]


Calcitriol (Rocaltrol  Cap*)  0.25 mcg PO DAILY [facility]


Dextrose (D50w Syringe 50 Ml*)  12.5 gm IV PUSH .FOR FS < 60 - SS PRN


Docusate Sodium (Colace Cap*)  100 mg PO BID [facility]


Famotidine (Pepcid Tab*)  20 mg PO DAILY PRN


Heparin Sodium (Porcine) (Heparin Vial(*))  5,000 units SUBCUT Q8HR [facility]


Insulin Glargine (Lantus(*))  18 units SUBCUT BEDTIME [facility]


Insulin Human Lispro (Humalog*)  0 units SUBCUT ACHS [facility]


Levothyroxine Sodium (Synthroid Tab*)  75 mcg PO 0600 [facility]


Lidocaine (Lidoderm 5% Patch*)  1 patch TRANSDERM 0900 [facility]


Lisinopril (Prinivil Tab*)  20 mg PO DAILY [facility]


Nystatin (Nystatin Top Powder*)  1 applic TOPICAL TID PRN


Ondansetron HCl (Zofran Inj*)  4 mg IV Q6H PRN


Oxycodone HCl (Roxycodone Tab*)  2.5 mg PO Q4H PRN


Pharmacy Profile Note (Lidocaine Patch Remove*)  1 note PATCH OFF 2100 [facility]


Quetiapine Fumarate (Seroquel Tab*)  12.5 mg PO BEDTIME PRN


Senna (Senokot Tab*)  1 tab PO BID [facility]





 Vital Signs











  09/30/17 09/30/17 09/30/17





  15:29 16:00 19:36


 


Temperature 98.1 F  97.9 F


 


Pulse Rate 52  49


 


Respiratory 18  17





Rate   


 


Blood Pressure 154/52  161/42





(mmHg)   


 


O2 Sat by Pulse 99 99 90





Oximetry   














  09/30/17 10/01/17 10/01/17





  20:00 01:35 07:41


 


Temperature  98.3 F 97.9 F


 


Pulse Rate  59 63


 


Respiratory 18 22 17





Rate   


 


Blood Pressure  180/58 171/52





(mmHg)   


 


O2 Sat by Pulse  94 97





Oximetry   











Oxygen Devices in Use Now: Nasal Cannula - 3L


Appearance: Elderly,  F, laying in bed in NAD


Eyes: No Scleral Icterus


Ears/Nose/Mouth/Throat: Mucous Membranes Moist


Neck: NL Appearance and Movements; NL JVP


Respiratory: Symmetrical Chest Expansion and Respiratory Effort, - - Slightly 

diminished in bases


Cardiovascular: NL Sounds; No Murmurs; No JVD, RRR


Abdominal: NL Sounds; No Tenderness; No Distention


Lymphatic: No Cervical Adenopathy


Extremities: No Edema


Skin: No Rash or Ulcers


Neurological: Alert and Oriented x 3


Result Diagrams: 


 09/29/17 05:23





 09/29/17 05:23





Assess/Plan/Problems-Billing





Mechanical fall and subsequent rib fxs in a 77 yo F with hx of HTN, HLD, obesity

, O2 dependent COPD and DM. Course complicated by delirium, MARISSA on CKD


 





- Patient Problems


(1) Rib fractures


Current Visit: Yes   Comment: Continue current tylenol and prn oxycodone. 

Completed ABx for presumed underlying PNA   





(2) Delirium


Current Visit: Yes   Comment: Multifactorial.  This is a combination of her 

baseline dementia, narcotics, and hospitalization. Continue qhs Seroquel.   





(3) Diabetes


Current Visit: Yes   Comment: Continue Lispro sliding scale and increased 

Lantus   





(4) AMRISSA (acute kidney injury)


Current Visit: Yes   Comment: Creatinine back to baseline   





(5) HTN (hypertension)


Current Visit: Yes   Comment: Continue Amlodipine and Atenolol. Increased 

Lisinopril back to 40 mg home dose.    





(6) O2 dependent


Current Visit: Yes   Comment: Patient denies COPD but is on home O2.   





(7) DVT prophylaxis


Current Visit: Yes   Comment: SQ heparin   


Status and Disposition: 





Inpatient. Delirium seems to have resolved. Possibly [facility] on Monday.

## 2017-10-02 VITALS — DIASTOLIC BLOOD PRESSURE: 55 MMHG | SYSTOLIC BLOOD PRESSURE: 188 MMHG

## 2017-10-02 RX ADMIN — HYDRALAZINE HYDROCHLORIDE PRN MG: 20 INJECTION INTRAMUSCULAR; INTRAVENOUS at 11:47

## 2017-10-02 RX ADMIN — HEPARIN SODIUM SCH UNITS: 5000 INJECTION INTRAVENOUS; SUBCUTANEOUS at 05:51

## 2017-10-02 RX ADMIN — ACETAMINOPHEN SCH MG: 325 TABLET ORAL at 10:34

## 2017-10-02 RX ADMIN — LEVOTHYROXINE SODIUM SCH MCG: 75 TABLET ORAL at 05:53

## 2017-10-02 RX ADMIN — ATORVASTATIN CALCIUM SCH MG: 10 TABLET, FILM COATED ORAL at 10:34

## 2017-10-02 RX ADMIN — ONDANSETRON PRN MG: 2 INJECTION INTRAMUSCULAR; INTRAVENOUS at 11:48

## 2017-10-02 RX ADMIN — SENNOSIDES SCH: 8.6 TABLET, FILM COATED ORAL at 11:54

## 2017-10-02 RX ADMIN — INSULIN LISPRO SCH UNITS: 100 INJECTION, SOLUTION INTRAVENOUS; SUBCUTANEOUS at 11:48

## 2017-10-02 RX ADMIN — ALLOPURINOL SCH MG: 100 TABLET ORAL at 10:35

## 2017-10-02 RX ADMIN — ATENOLOL SCH MG: 50 TABLET ORAL at 10:35

## 2017-10-02 RX ADMIN — CALCITRIOL SCH MCG: 0.25 CAPSULE ORAL at 10:41

## 2017-10-02 RX ADMIN — CHLORTHALIDONE SCH MG: 50 TABLET ORAL at 10:41

## 2017-10-02 RX ADMIN — DOCUSATE SODIUM SCH: 100 CAPSULE, LIQUID FILLED ORAL at 11:54

## 2017-10-02 RX ADMIN — AMLODIPINE BESYLATE SCH MG: 5 TABLET ORAL at 10:35

## 2017-10-02 RX ADMIN — LIDOCAINE SCH: 50 PATCH CUTANEOUS at 11:54

## 2017-10-02 RX ADMIN — INSULIN LISPRO SCH UNITS: 100 INJECTION, SOLUTION INTRAVENOUS; SUBCUTANEOUS at 10:42

## 2017-10-02 NOTE — DCNOTE
Patient seen this morning. Reports pain and breathing are stable. Had small 

amount of N/V this morning which has resolved.


On exam, mild bradycardia, s1 and s2 present, no m/g/r, lungs CTA B/L, no LE 

edema


Plan to discharge to [facility] today for rehab.

## 2017-10-02 NOTE — DS
CC:  Dr. Motta

 

DISCHARGE SUMMARY:

 

DATE OF ADMISSION:  09/21/17

 

DATE OF DISCHARGE:  10/02/17

 

PRIMARY CARE PHYSICIAN:  Dr. Motta.

 

PRINCIPAL DISCHARGE DIAGNOSES:

1.  Mechanical fall with multiple rib fractures.

2.  Pneumonia.

3.  Acute kidney injury.

4.  Delirium.

 

SECONDARY DIAGNOSES:

1.  Type 2 diabetes.

2.  Hypertension.

3.  Hyperlipidemia.

4.  Obesity.

5.  Oxygen-dependent chronic obstructive pulmonary disease.

6.  Gout.

7.  History of degenerative disk disease.

 

DISCHARGE MEDICATION REGIMEN:

1.  Tylenol 975 mg by mouth 3 times daily.

2.  Lispro sliding scale.

3.  Lidocaine patch once daily.

4.  Senna 1 tablet by mouth 2 times daily.

5.  Norvasc 5 mg by mouth daily.

6.  Atenolol/chlorthalidone 50-25 mg 1 tablet by mouth daily.

7.  Potassium 10 mEq by mouth daily.

8.  Glipizide 10 mg by mouth 2 times daily.

9.  Synthroid 75 mcg by mouth daily.

10.  Neurontin 600 mg by mouth 3 times daily.

11.  Simvastatin 20 mg by mouth daily.

12.  Lantus 18 units subcutaneous at bedtime.

13.  Tramadol 50 mg by mouth 3 times daily as needed for pain.

14.  Lisinopril 40 mg by mouth daily.

15.  Allopurinol 100 mg by mouth daily.

16.  Calcitriol 0.25 mcg by mouth daily.

 

STUDIES DONE DURING HOSPITALIZATION:  Rib x-ray:  No displaced rib fracture or pneumothorax.  Clinic
ally bibasilar atelectasis versus consolidation.  Left shoulder x-ray:  Impression:  Advanced osteoa
rthritis at the acromioclavicular joint with progression of less marked osteoarthritis of the glenoh
umeral joint, stigmata of calcific tendinopathy, negative for fracture.  CT chest and abdomen withou
t contrast:  Impression:  Multiple posterolateral left rib fractures with only the 6th rib fracture 
exhibiting any significant displacement.  No left-sided pneumothorax or large pleural effusion.  Cor
tical irregularity at the tip of the left scapula may have been a fracture as well, but the margins 
indicate a more chronic process.  Likely a left adrenal lipoma measuring 11 mm in the greatest dimen
isabella.  This finding can be followed up or further characterized by multi-phase CT or MRI of the abdo
men according to adrenal gland protocol.  Clinical significance is doubtful.  Mild diffuse ground-gl
ass opacification and thickening of the interlobular septa could be due to chronic congestive heart 
failure or early interstitial lung disease.  There are additional chronic and degenerative changes a
s described in the body of the report, unlikely to be related to the patient's acute presentation."

 

Chest x-ray:  Impression:  Cardiomegaly with interstitial edema, likely pleural effusions from 09/24
/17.

 

HISTORY OF PRESENT ILLNESS AND HOSPITAL SUMMARY:  Please see the full history and physical by Dr. Randa Leal for full details.  Briefly, Ms. Muir is a 76-year- old female with past medical hist
ory as above, who presented to the hospital after a mechanical fall where she struck her left side o
f her upper back on the toilet seat.  She suffered 5 broken ribs that were noted on CT scan here.  S
he was treated conservatively with pain control; however, the patient did not tolerate narcotics wel
l and became delirious.  She had decreased p.o. intake and subsequently developed acute on chronic r
enal failure.  FENa was less than 1%.  She was treated with IV fluids with improvement of her kidney
 function back to her baseline.

 

The patient was treated sparingly with narcotics due to the delirium and was started on a low dose o
f Seroquel at night with improvement in her symptoms.  She seemed to be tolerating the pain with jus
t around-the-clock Tylenol and she had a low dose of oxycodone 2.5 mg ordered; however, she had not 
used it for 3 days prior to discharge.  She will be sent home on her previous dose of tramadol, whic
h can be given as needed in addition to her around-the-clock Tylenol and Lidoderm patch. The patient
 had a fever during this hospitalization and there was some concern for possible pneumonia, so the p
atient was treated with a full dose of Levaquin while here in the hospital.

 

She was evaluated by physical therapy and was felt to benefit from continued rehab. She will be disc
harged to [facility] for short-term rehab there.

 

TIME SPENT:  Total time spent on this discharge was 45 minutes.

 

This is a summary of the hospitalization.  Please see the full medical record for further details.

 

 [id number]/[facility] #: [id number]

## 2018-05-07 ENCOUNTER — HOSPITAL ENCOUNTER (INPATIENT)
Dept: HOSPITAL 25 - ED | Age: 78
LOS: 6 days | Discharge: HOME | DRG: 291 | End: 2018-05-13
Attending: INTERNAL MEDICINE | Admitting: INTERNAL MEDICINE
Payer: MEDICARE

## 2018-05-07 DIAGNOSIS — Z79.1: ICD-10-CM

## 2018-05-07 DIAGNOSIS — M51.36: ICD-10-CM

## 2018-05-07 DIAGNOSIS — Z79.84: ICD-10-CM

## 2018-05-07 DIAGNOSIS — E11.22: ICD-10-CM

## 2018-05-07 DIAGNOSIS — I50.31: ICD-10-CM

## 2018-05-07 DIAGNOSIS — J96.21: ICD-10-CM

## 2018-05-07 DIAGNOSIS — E78.5: ICD-10-CM

## 2018-05-07 DIAGNOSIS — I44.7: ICD-10-CM

## 2018-05-07 DIAGNOSIS — Z82.49: ICD-10-CM

## 2018-05-07 DIAGNOSIS — Z91.048: ICD-10-CM

## 2018-05-07 DIAGNOSIS — Z87.891: ICD-10-CM

## 2018-05-07 DIAGNOSIS — Z99.81: ICD-10-CM

## 2018-05-07 DIAGNOSIS — Z88.8: ICD-10-CM

## 2018-05-07 DIAGNOSIS — N18.4: ICD-10-CM

## 2018-05-07 DIAGNOSIS — G89.29: ICD-10-CM

## 2018-05-07 DIAGNOSIS — Z83.3: ICD-10-CM

## 2018-05-07 DIAGNOSIS — J44.9: ICD-10-CM

## 2018-05-07 DIAGNOSIS — Z79.899: ICD-10-CM

## 2018-05-07 DIAGNOSIS — M10.9: ICD-10-CM

## 2018-05-07 DIAGNOSIS — Z88.0: ICD-10-CM

## 2018-05-07 DIAGNOSIS — E83.42: ICD-10-CM

## 2018-05-07 DIAGNOSIS — R00.1: ICD-10-CM

## 2018-05-07 DIAGNOSIS — Z79.4: ICD-10-CM

## 2018-05-07 DIAGNOSIS — I13.0: Primary | ICD-10-CM

## 2018-05-07 LAB
BASOPHILS # BLD AUTO: 0.1 10^3/UL (ref 0–0.2)
EOSINOPHIL # BLD AUTO: 0.3 10^3/UL (ref 0–0.6)
HCT VFR BLD AUTO: 36 % (ref 35–47)
HGB BLD-MCNC: 11.7 G/DL (ref 12–16)
INR PPP/BLD: 1.04 (ref 0.77–1.02)
LYMPHOCYTES # BLD AUTO: 1.6 10^3/UL (ref 1–4.8)
MCH RBC QN AUTO: 34 PG (ref 27–31)
MCHC RBC AUTO-ENTMCNC: 33 G/DL (ref 31–36)
MCV RBC AUTO: 103 FL (ref 80–97)
MONOCYTES # BLD AUTO: 0.8 10^3/UL (ref 0–0.8)
NEUTROPHILS # BLD AUTO: 7.6 10^3/UL (ref 1.5–7.7)
NRBC # BLD AUTO: 0 10^3/UL
NRBC BLD QL AUTO: 0.1
PLATELET # BLD AUTO: 202 10^3/UL (ref 150–450)
RBC # BLD AUTO: 3.47 10^6/UL (ref 4–5.4)
WBC # BLD AUTO: 10.3 10^3/UL (ref 3.5–10.8)

## 2018-05-07 PROCEDURE — 85025 COMPLETE CBC W/AUTO DIFF WBC: CPT

## 2018-05-07 PROCEDURE — 82550 ASSAY OF CK (CPK): CPT

## 2018-05-07 PROCEDURE — 93306 TTE W/DOPPLER COMPLETE: CPT

## 2018-05-07 PROCEDURE — 71045 X-RAY EXAM CHEST 1 VIEW: CPT

## 2018-05-07 PROCEDURE — 86140 C-REACTIVE PROTEIN: CPT

## 2018-05-07 PROCEDURE — 93005 ELECTROCARDIOGRAM TRACING: CPT

## 2018-05-07 PROCEDURE — 85610 PROTHROMBIN TIME: CPT

## 2018-05-07 PROCEDURE — 99283 EMERGENCY DEPT VISIT LOW MDM: CPT

## 2018-05-07 PROCEDURE — 87086 URINE CULTURE/COLONY COUNT: CPT

## 2018-05-07 PROCEDURE — 36415 COLL VENOUS BLD VENIPUNCTURE: CPT

## 2018-05-07 PROCEDURE — 84484 ASSAY OF TROPONIN QUANT: CPT

## 2018-05-07 PROCEDURE — 80048 BASIC METABOLIC PNL TOTAL CA: CPT

## 2018-05-07 PROCEDURE — 84443 ASSAY THYROID STIM HORMONE: CPT

## 2018-05-07 PROCEDURE — G0378 HOSPITAL OBSERVATION PER HR: HCPCS

## 2018-05-07 PROCEDURE — A9558 XE133 XENON 10MCI: HCPCS

## 2018-05-07 PROCEDURE — 85730 THROMBOPLASTIN TIME PARTIAL: CPT

## 2018-05-07 PROCEDURE — 83036 HEMOGLOBIN GLYCOSYLATED A1C: CPT

## 2018-05-07 PROCEDURE — A9540 TC99M MAA: HCPCS

## 2018-05-07 PROCEDURE — 85379 FIBRIN DEGRADATION QUANT: CPT

## 2018-05-07 PROCEDURE — 81015 MICROSCOPIC EXAM OF URINE: CPT

## 2018-05-07 PROCEDURE — 83735 ASSAY OF MAGNESIUM: CPT

## 2018-05-07 PROCEDURE — 83880 ASSAY OF NATRIURETIC PEPTIDE: CPT

## 2018-05-07 PROCEDURE — 78582 LUNG VENTILAT&PERFUS IMAGING: CPT

## 2018-05-07 PROCEDURE — 71046 X-RAY EXAM CHEST 2 VIEWS: CPT

## 2018-05-07 PROCEDURE — 94640 AIRWAY INHALATION TREATMENT: CPT

## 2018-05-07 PROCEDURE — 81003 URINALYSIS AUTO W/O SCOPE: CPT

## 2018-05-07 PROCEDURE — 83605 ASSAY OF LACTIC ACID: CPT

## 2018-05-07 PROCEDURE — 80053 COMPREHEN METABOLIC PANEL: CPT

## 2018-05-07 PROCEDURE — 82553 CREATINE MB FRACTION: CPT

## 2018-05-07 PROCEDURE — 82947 ASSAY GLUCOSE BLOOD QUANT: CPT

## 2018-05-07 PROCEDURE — 83690 ASSAY OF LIPASE: CPT

## 2018-05-07 RX ADMIN — TRAMADOL HYDROCHLORIDE SCH MG: 50 TABLET, FILM COATED ORAL at 22:25

## 2018-05-07 RX ADMIN — GABAPENTIN SCH MG: 300 CAPSULE ORAL at 22:14

## 2018-05-07 RX ADMIN — HYDRALAZINE HYDROCHLORIDE PRN MG: 20 INJECTION INTRAMUSCULAR; INTRAVENOUS at 21:08

## 2018-05-07 NOTE — RAD
INDICATION: Short of breath     



COMPARISON: September 24, 2017

 

TECHNIQUE: An AP portable view obtained at 1438 hours is submitted.



FINDINGS: 



Bones/Soft Tissues: There are no acute bony findings.    



Cardiomediastinal: The cardiac silhouette, central pulmonary vessel, and interstitium are

prominent consistent with moderate vascular congestion. 



Lungs: Early alveolar infiltrative change likely pulmonary edema.



Pleura: Small bilateral pleural effusions. 



Other: None



IMPRESSION:  MODERATE VASCULAR CONGESTIVE FINDINGS.

## 2018-05-07 NOTE — ED
David MISHRA Nilda, scribed for Juan Morales MD on 05/07/18 at 1411 .





Shortness of Breath





- HPI Summary


HPI Summary: 





This patient is a 77 year old F presenting to [facility] with a chief complaint of 

exacerbated acute respiratory distress while at rest for the past month and a 

half that worsened today. The patient rates the pain 0/10 in severity. Symptoms 

aggravated by exertion and alleviated by nose spray. Patient reports weight loss

, edema, nonproductive cough, and dry mouth. Patient denies bilat calf pain, 

wheezing, fever, chills, CP, loss of appetite, bloody stool, and burning with 

urination. Pt is on 3L O2 at home (recently increased from 2L O2). Medications 

include water pills. Pt states Dr. Motta (PCP) referred pt to ED. 








- History of Current Complaint


Chief Complaint: EDShortnessOfBreath


Time Seen by Provider: 05/07/18 13:41


Hx Obtained From: Patient


Onset/Duration: Sudden Onset, Lasting Weeks, Still Present


Timing: Constant


Dyspnea At: Rest


Aggrevating Factors: Movement


Alleviating Factors: OTC Meds


Associated Signs & Symptoms: Cough (Nonproductive), Edema





- Allergy/Home Medications


Allergies/Adverse Reactions: 


 Allergies











Allergy/AdvReac Type Severity Reaction Status Date / Time


 


Adhesive Tape [Paper Tape] Allergy  Rash Verified 05/07/18 17:05


 


cephalexin [From Keflex] Allergy  Unknown Verified 05/07/18 17:05





   Reaction  





   Details  


 


duloxetine [From Cymbalta] Allergy  Unknown Verified 05/07/18 17:05





   Reaction  





   Details  


 


Penicillins Allergy  Unknown Verified 05/07/18 17:05





   Reaction  





   Details  


 


sulfamethoxazole Allergy  Unknown Verified 05/07/18 17:05





[From Bactrim]   Reaction  





   Details  


 


trimethoprim [From Bactrim] Allergy  Unknown Verified 05/07/18 17:05





   Reaction  





   Details  


 


grantrisin Allergy  Unknown Uncoded 09/21/17 14:57





   Reaction  





   Details  














PMH/Surg Hx/FS Hx/Imm Hx


Endocrine/Hematology History: Reports: Hx Diabetes, Hx Thyroid Disease


Cardiovascular History: Reports: Hx Hypercholesterolemia, Hx Hypertension


Respiratory History: Reports: Hx Chronic Obstructive Pulmonary Disease (COPD) - 

2L home O2


 History: Reports: Hx Chronic Renal Failure - Second to DM


Musculoskeletal History: Reports: Hx Arthritis, Hx Back Problems - DJD, Hx Gout


Sensory History: Reports: Hx Contacts or Glasses


   Denies: Hx Legally Blind, Hx Deafness, Hx Hearing Aid, Hx Hearing Problem


Opthamlomology History: Reports: Hx Contacts or Glasses


   Denies: Hx Legally Blind





- Surgical History


Surgery Procedure, Year, and Place: ORIF of ankle


Infectious Disease History: No


Infectious Disease History: 


   Denies: Traveled Outside the [place] in Last 30 Days





- Family History


Known Family History: Positive: Diabetes





- Social History


Alcohol Use: None


Substance Use Type: Reports: None


Smoking Status (MU): Never Smoked Tobacco





Review of Systems


Negative: Fever, Chills


Positive: Other - dry mouth


Negative: Chest Pain


Positive: Shortness Of Breath, Cough, Other - negative wheezing


Positive: Other - weight loss; negative bloody stool, loss of appetite


Negative: burning


Positive: Edema, Other - negative bilat calf pain


All Other Systems Reviewed And Are Negative: Yes





Physical Exam





- Summary


Physical Exam Summary: 





General: well-appearing, no pain distress


Skin: warm, color reflects adequate perfusion, dry


Head: normal


Eyes: EOMI, AZAEL


ENT: normal


Neck: supple, nontender


Respiratory: CTA, breath sounds present


Cardiovascular: RRR


Abdomen: soft, nontender


Bowel: present


Musculoskeletal: normal, strength/ROM intact


Neurological: normal, sensory/motor intact, A&O x3


Psychological: affect/mood appropriate








Triage Information Reviewed: Yes


Vital Signs On Initial Exam: 


 Initial Vitals











Temp Pulse Resp BP Pulse Ox


 


 98.6 F   108   20   212/85   84 


 


 05/07/18 12:42  05/07/18 12:42  05/07/18 12:42  05/07/18 12:42  05/07/18 12:42











Vital Signs Reviewed: Yes





Diagnostics





- Vital Signs


 Vital Signs











  Temp Pulse Resp BP Pulse Ox


 


 05/07/18 12:42  98.6 F  108  20  212/85  84














- Laboratory


Lab Results: 


 Lab Results











  05/07/18 05/07/18 05/07/18 Range/Units





  14:25 14:25 14:25 


 


WBC  10.3    (3.5-10.8)  10^3/ul


 


RBC  3.47 L    (4.0-5.4)  10^6/ul


 


Hgb  11.7 L    (12.0-16.0)  g/dl


 


Hct  36    (35-47)  %


 


MCV  103 H    (80-97)  fL


 


MCH  34 H    (27-31)  pg


 


MCHC  33    (31-36)  g/dl


 


RDW  16 H    (10.5-15)  %


 


Plt Count  202    (150-450)  10^3/ul


 


MPV  7.9    (7.4-10.4)  um3


 


Neut % (Auto)  73.7    (38-83)  %


 


Lymph % (Auto)  15.3 L    (25-47)  %


 


Mono % (Auto)  7.4 H    (0-7)  %


 


Eos % (Auto)  2.9    (0-6)  %


 


Baso % (Auto)  0.7    (0-2)  %


 


Absolute Neuts (auto)  7.6    (1.5-7.7)  10^3/ul


 


Absolute Lymphs (auto)  1.6    (1.0-4.8)  10^3/ul


 


Absolute Monos (auto)  0.8    (0-0.8)  10^3/ul


 


Absolute Eos (auto)  0.3    (0-0.6)  10^3/ul


 


Absolute Basos (auto)  0.1    (0-0.2)  10^3/ul


 


Absolute Nucleated RBC  0    10^3/ul


 


Nucleated RBC %  0.1    


 


INR (Anticoag Therapy)   1.04 H   (0.77-1.02)  


 


APTT   32.9   (26.0-36.3)  seconds


 


D-Dimer, Quantitative   631 H   (Less Than 230)  ng/mL


 


Sodium    145  (139-145)  mmol/L


 


Potassium    4.3  (3.5-5.0)  mmol/L


 


Chloride    109  (101-111)  mmol/L


 


Carbon Dioxide    26  (22-32)  mmol/L


 


Anion Gap    10  (2-11)  mmol/L


 


BUN    36 H  (6-24)  mg/dL


 


Creatinine    1.91 H  (0.51-0.95)  mg/dL


 


Est GFR ( Amer)    32.9  (>60)  


 


Est GFR (Non-Af Amer)    25.5  (>60)  


 


BUN/Creatinine Ratio    18.8  (8-20)  


 


Glucose    77  ()  mg/dL


 


Lactic Acid     (0.5-2.0)  mmol/L


 


Calcium    10.4 H  (8.6-10.3)  mg/dL


 


Magnesium    1.8 L  (1.9-2.7)  mg/dL


 


Total Bilirubin    0.60  (0.2-1.0)  mg/dL


 


AST    19  (13-39)  U/L


 


ALT    13  (7-52)  U/L


 


Alkaline Phosphatase    106 H  ()  U/L


 


Total Creatine Kinase    48  ()  U/L


 


CK-MB (CK-2)    2.7  (0.6-6.3)  ng/mL


 


Troponin I    0.03  (<0.04)  ng/mL


 


C-Reactive Protein    6.50 H  (< 5.00)  mg/L


 


B-Natriuretic Peptide    ( - 100) pg/mL


 


Total Protein    7.9  (6.4-8.9)  g/dL


 


Albumin    4.4  (3.2-5.2)  g/dL


 


Globulin    3.5  (2-4)  g/dL


 


Albumin/Globulin Ratio    1.3  (1-3)  


 


Lipase    37  (11.0-82.0)  U/L


 


TSH    1.90  (0.34-5.60)  mcIU/mL


 


Urine Color     


 


Urine Appearance     


 


Urine pH     (5-9)  


 


Ur Specific Gravity     (1.010-1.030)  


 


Urine Protein     (Negative)  


 


Urine Ketones     (Negative)  


 


Urine Blood     (Negative)  


 


Urine Nitrate     (Negative)  


 


Urine Bilirubin     (Negative)  


 


Urine Urobilinogen     (Negative)  


 


Ur Leukocyte Esterase     (Negative)  


 


Urine WBC (Auto)     (Absent)  


 


Urine RBC (Auto)     (Absent)  


 


Ur Squamous Epith Cells     (Absent)  


 


Ur Renal Epithelial Cell     (Absent)  


 


Urine Bacteria     (Absent)  


 


Urine Glucose     (Negative)  


 


Urine Ascorbic Acid     (Negative)  














  05/07/18 05/07/18 05/07/18 Range/Units





  14:25 14:25 16:28 


 


WBC     (3.5-10.8)  10^3/ul


 


RBC     (4.0-5.4)  10^6/ul


 


Hgb     (12.0-16.0)  g/dl


 


Hct     (35-47)  %


 


MCV     (80-97)  fL


 


MCH     (27-31)  pg


 


MCHC     (31-36)  g/dl


 


RDW     (10.5-15)  %


 


Plt Count     (150-450)  10^3/ul


 


MPV     (7.4-10.4)  um3


 


Neut % (Auto)     (38-83)  %


 


Lymph % (Auto)     (25-47)  %


 


Mono % (Auto)     (0-7)  %


 


Eos % (Auto)     (0-6)  %


 


Baso % (Auto)     (0-2)  %


 


Absolute Neuts (auto)     (1.5-7.7)  10^3/ul


 


Absolute Lymphs (auto)     (1.0-4.8)  10^3/ul


 


Absolute Monos (auto)     (0-0.8)  10^3/ul


 


Absolute Eos (auto)     (0-0.6)  10^3/ul


 


Absolute Basos (auto)     (0-0.2)  10^3/ul


 


Absolute Nucleated RBC     10^3/ul


 


Nucleated RBC %     


 


INR (Anticoag Therapy)     (0.77-1.02)  


 


APTT     (26.0-36.3)  seconds


 


D-Dimer, Quantitative     (Less Than 230)  ng/mL


 


Sodium     (139-145)  mmol/L


 


Potassium     (3.5-5.0)  mmol/L


 


Chloride     (101-111)  mmol/L


 


Carbon Dioxide     (22-32)  mmol/L


 


Anion Gap     (2-11)  mmol/L


 


BUN     (6-24)  mg/dL


 


Creatinine     (0.51-0.95)  mg/dL


 


Est GFR ( Amer)     (>60)  


 


Est GFR (Non-Af Amer)     (>60)  


 


BUN/Creatinine Ratio     (8-20)  


 


Glucose     ()  mg/dL


 


Lactic Acid  1.0    (0.5-2.0)  mmol/L


 


Calcium     (8.6-10.3)  mg/dL


 


Magnesium     (1.9-2.7)  mg/dL


 


Total Bilirubin     (0.2-1.0)  mg/dL


 


AST     (13-39)  U/L


 


ALT     (7-52)  U/L


 


Alkaline Phosphatase     ()  U/L


 


Total Creatine Kinase     ()  U/L


 


CK-MB (CK-2)     (0.6-6.3)  ng/mL


 


Troponin I     (<0.04)  ng/mL


 


C-Reactive Protein     (< 5.00)  mg/L


 


B-Natriuretic Peptide   2006 H  ( - 100) pg/mL


 


Total Protein     (6.4-8.9)  g/dL


 


Albumin     (3.2-5.2)  g/dL


 


Globulin     (2-4)  g/dL


 


Albumin/Globulin Ratio     (1-3)  


 


Lipase     (11.0-82.0)  U/L


 


TSH     (0.34-5.60)  mcIU/mL


 


Urine Color    Yellow  


 


Urine Appearance    Cloudy  


 


Urine pH    5.0  (5-9)  


 


Ur Specific Gravity    1.014  (1.010-1.030)  


 


Urine Protein    2+(100 mg/dl) A  (Negative)  


 


Urine Ketones    Negative  (Negative)  


 


Urine Blood    Negative  (Negative)  


 


Urine Nitrate    Negative  (Negative)  


 


Urine Bilirubin    Negative  (Negative)  


 


Urine Urobilinogen    Negative  (Negative)  


 


Ur Leukocyte Esterase    1+ A  (Negative)  


 


Urine WBC (Auto)    3+(>20/hpf) A  (Absent)  


 


Urine RBC (Auto)    Absent  (Absent)  


 


Ur Squamous Epith Cells    Present A  (Absent)  


 


Ur Renal Epithelial Cell    Present A  (Absent)  


 


Urine Bacteria    Absent  (Absent)  


 


Urine Glucose    Negative  (Negative)  


 


Urine Ascorbic Acid    * A  (Negative)  











Result Diagrams: 


 05/07/18 14:25





 05/07/18 14:25


Lab Statement: Any lab studies that have been ordered have been reviewed, and 

results considered in the medical decision making process.





- Radiology


  ** CXR


Radiology Interpretation Completed By: Radiologist - CXR reveals moderate 

vascular congestive findings. Dr. Morales has reviewed this radiology report.





- EKG


  ** 1301


Cardiac Rate: Bradycardia - 59 bpm


EKG Rhythm: Sinus Bradycardia


EKG Interpretation: LBBB (seen in prior EKG 9/23/17), Prolong IN interval of 

214.





Course/Dx





- Course


Course Of Treatment: Medications reviewed. Allergies reviewed. BP noted and 

advised to follow up with PCP.  ADMIT HOSPITALIST.  CRITICAL CARE TIME LESS 

THAN 30 MINUTES.





- Diagnoses


Provider Diagnoses: 


 HTN (hypertension), CHF (congestive heart failure)








- Physician Notifications


Discussed Care of Patient With: Carolyne Leal - Hospitalist


Time Discussed With Above Provider: 17:07


Instructed by Provider To: Admit As Inpatient





Discharge





- Sign-Out/Discharge


Documenting (check all that apply): Discharge/Admit/Transfer





- Discharge Plan


Condition: Stable


Disposition: ADMITTED TO [facility]





- Billing Disposition and Condition


Condition: STABLE


Disposition: [facility]





The documentation as recorded by the David lewis Nilda accurately 

reflects the service I personally performed and the decisions made by me, 

Juan Morales MD.

## 2018-05-08 RX ADMIN — INSULIN GLARGINE SCH UNITS: 100 INJECTION, SOLUTION SUBCUTANEOUS at 21:56

## 2018-05-08 RX ADMIN — CALCITRIOL SCH MCG: 0.25 CAPSULE ORAL at 08:43

## 2018-05-08 RX ADMIN — NYSTATIN SCH: 100000 CREAM TOPICAL at 21:56

## 2018-05-08 RX ADMIN — HEPARIN SODIUM SCH UNITS: 5000 INJECTION INTRAVENOUS; SUBCUTANEOUS at 21:56

## 2018-05-08 RX ADMIN — NYSTATIN SCH: 100000 POWDER TOPICAL at 12:39

## 2018-05-08 RX ADMIN — LISINOPRIL SCH MG: 10 TABLET ORAL at 08:43

## 2018-05-08 RX ADMIN — GABAPENTIN SCH MG: 300 CAPSULE ORAL at 13:45

## 2018-05-08 RX ADMIN — FUROSEMIDE SCH MG: 10 INJECTION, SOLUTION INTRAMUSCULAR; INTRAVENOUS at 08:44

## 2018-05-08 RX ADMIN — LEVOTHYROXINE SODIUM SCH MCG: 75 TABLET ORAL at 05:50

## 2018-05-08 RX ADMIN — POTASSIUM CHLORIDE SCH MEQ: 750 TABLET, FILM COATED, EXTENDED RELEASE ORAL at 08:44

## 2018-05-08 RX ADMIN — HEPARIN SODIUM SCH UNITS: 5000 INJECTION INTRAVENOUS; SUBCUTANEOUS at 15:47

## 2018-05-08 RX ADMIN — GABAPENTIN SCH MG: 300 CAPSULE ORAL at 21:55

## 2018-05-08 RX ADMIN — NYSTATIN SCH APPLIC: 100000 CREAM TOPICAL at 13:46

## 2018-05-08 RX ADMIN — INSULIN LISPRO SCH: 100 INJECTION, SOLUTION INTRAVENOUS; SUBCUTANEOUS at 16:23

## 2018-05-08 RX ADMIN — INSULIN LISPRO SCH: 100 INJECTION, SOLUTION INTRAVENOUS; SUBCUTANEOUS at 11:53

## 2018-05-08 RX ADMIN — AMLODIPINE BESYLATE SCH MG: 5 TABLET ORAL at 08:44

## 2018-05-08 RX ADMIN — TRAMADOL HYDROCHLORIDE SCH MG: 50 TABLET, FILM COATED ORAL at 08:49

## 2018-05-08 RX ADMIN — CHLORTHALIDONE SCH MG: 50 TABLET ORAL at 08:44

## 2018-05-08 RX ADMIN — NYSTATIN SCH: 100000 POWDER TOPICAL at 08:50

## 2018-05-08 RX ADMIN — ATORVASTATIN CALCIUM SCH MG: 10 TABLET, FILM COATED ORAL at 08:43

## 2018-05-08 RX ADMIN — GABAPENTIN SCH MG: 300 CAPSULE ORAL at 08:43

## 2018-05-08 RX ADMIN — ALLOPURINOL SCH MG: 100 TABLET ORAL at 08:43

## 2018-05-08 RX ADMIN — INSULIN LISPRO SCH: 100 INJECTION, SOLUTION INTRAVENOUS; SUBCUTANEOUS at 08:21

## 2018-05-08 RX ADMIN — TRAMADOL HYDROCHLORIDE SCH MG: 50 TABLET, FILM COATED ORAL at 21:55

## 2018-05-08 NOTE — HP
CC:  Alhaji Motta MD *

 

HISTORY AND PHYSICAL:

 

DATE OF ADMISSION:  05/07/18

 

PRIMARY CARE PROVIDER:  Alhaji Motta MD

 

ATTENDING PHYSICIAN:  Carolyne Leal MD * (dictated by Shanae Solorzano NP).

 

CHIEF COMPLAINT:  Shortness of breath.

 

HISTORY OF PRESENT ILLNESS:  Ms. Muir is a 77-year-old female with past 
medical history significant for degenerative joint disease, type 2 diabetes 
mellitus, hypertension, hyperlipidemia, obesity, COPD and oxygen dependent, gout
, stage 3 chronic kidney disease, who states that over the last month and a half
, she has had increased shortness of breath, especially with ambulation but 
also at rest. According to the patient's daughter, she is generally on 2 L of 
oxygen via nasal cannula.  Last week when she noticed that her mother was 
having increased shortness of breath, she increased her oxygen to 3 L via nasal 
cannula.  The patient eats a lot of pre-made and boxed foods, most of her foods 
are not homemade.  She denies any recent fevers, chills, chest pain.  She 
chronically has a cough.  She describes this being caused from "a tickle".  She 
has a lower extremity edema that she reports taking furosemide for.  She 
chronically has shortness of breath, but again this is worse than her baseline.
  She denies nausea, vomiting, diarrhea, dysuria, or other urinary symptoms.  
She reports that her feet are often swollen, especially when her feet are down 
and not elevated.  Due to the patient's shortness of breath, her primary care 
provider recommended she go to the emergency room for further evaluation.

 

While in the emergency room, the patient had a chest x-ray showing moderate 
vascular congestion.  She had an EKG showing a sinus bradycardia.  She had labs 
that were fairly unremarkable with the exception of BNP of 2006.  Due to the 
patient's shortness of breath and suspected heart failure, the hospitalists 
were asked to evaluate the patient for admission.

 

PAST MEDICAL HISTORY:

1.  Degenerative disk disease.

2.  Type 2 diabetes mellitus.

3.  Hypertension.

4.  Hyperlipidemia.

5.  Obesity.

6.  COPD, oxygen dependent.

7.  Gout.

8.  Chronic kidney disease stage 3.

 

PAST SURGICAL HISTORY:  Status post right leg ORIF.

 

HOME MEDICATIONS:  Include:

1.  Levothyroxine 75 mcg oral daily.

2.  Lantus 38 units subcutaneous twice daily.

3.  Metformin 1000 mg oral twice daily.

4.  Furosemide 20 mg oral daily.

5.  Calcitriol 0.25 mcg oral daily.

6.  Atenolol/chlorthalidone 50/25, 1 tablet oral daily.

7.  Lisinopril 20 mg oral daily.

8.  Allopurinol 100 mg oral daily.

9.  Acetaminophen 975 mg oral 3 times daily as needed for pain.

10.  Potassium chloride 10 mEq oral daily.

11.  Imodium 2 mg oral every 4 hours as needed for loose stools.

12.  Gabapentin 600 mg oral 3 times daily.

13.  Tramadol 50 mg oral 3 times daily.

14.  Simvastatin 20 mg oral daily.

15.  Amlodipine 5 mg oral daily.

 

ALLERGIES:  ADHESIVE TAPE, CEPHALEXIN, DULOXETINE, PENICILLIN, BACTRIM, and 
GANTRISIN.

 

FAMILY HISTORY:  The patient's father had a history of coronary artery disease 
and diabetes mellitus.  There is no family history of cancer.

 

SOCIAL HISTORY:  The patient quit smoking in 1999.  She states prior to that 
she had a 1 pack a day smoking history for many years.  She denies alcohol or 
recreational drug use.  She lives with her daughter.  Her daughter, Sarah Hdez
, will be her surrogate decision maker in the event she is unable to make 
decisions for herself.

 

REVIEW OF SYSTEMS:  I performed an 11-point review of systems.  All the 
pertinent positives and negatives are mentioned in the history of present 
illness.  The remaining review of systems are negative.

 

                               PHYSICAL EXAMINATION

 

GENERAL APPEARANCE:  The patient is alert, pleasant, appears to be in no acute 
distress.

 

VITAL SIGNS:  Temperature 98.5, pulse 52, respiratory rate 16, O2 sat 95% on 6 
L via nasal cannula, blood pressure 171/100.

 

HEENT:  Normocephalic, atraumatic.  Pupils are equal and reactive to light. 
Extraocular movements are intact.

 

NECK:  Supple.

 

RESPIRATORY:  There is no accessory muscle use and the lungs have some fine 
crackles in the bases.

 

CARDIOVASCULAR:  Regular rate and rhythm.  S1 and S2 present.  There are no 
murmurs, rubs or gallops heard.

 

ABDOMEN:  Soft, nontender and nondistended.  There are bowel sounds present x4.

 

EXTREMITIES:  There is 1+ bilateral lower extremity pitting edema up to her 
knees. DP and PT pulses are 2+ and symmetric.

 

MUSCULOSKELETAL:  There is no clubbing or cyanosis noted.  The patient exhibits 
good strength in all extremities.

 

NEUROLOGIC:  The patient is alert and oriented x4.  Cranial nerves II through 
XII are grossly intact.

 

PSYCHOLOGICAL:  The patient is calm and cooperative.

 

SKIN:  There are no rashes or abnormalities seen.

 

 DIAGNOSTIC STUDIES/LABORATORY DATA:  Sodium 145, potassium 4.3, chloride 109, 
CO2 of 26, BUN 36, creatinine 1.91, glucose 77.  White blood cell count 10.3, 
hemoglobin 11.7, hematocrit 36, and platelet count 202,000.  , D-dimer 
631. BNP 2006.  CRP 6.5.

 

EKG shows a sinus roger at a rate of 59 and a left bundle-branch block.  This 
EKG is from our previous from 09/23/17.

 

Chest x-ray from today.  Radiologist's impression:  Moderate vascular 
congestive findings.

 

IMPRESSION:  Ms. Muir is a 77-year-old female with a past medical history 
significant for degenerative disk disease, diabetes type 2, hypertension, 
hyperlipidemia, obesity, chronic obstructive pulmonary disease with oxygen 
dependence, gout, chronic kidney disease stage 3, who presented to the 
emergency room with complaints of increased shortness of breath for 1-1/2 
months.  She will be admitted as an observation for suspected congestive heart 
failure.

 

ASSESSMENT/PLAN:

1.  Shortness of breath.  I suspect this is secondary to congestive heart 
failure. The patient last had an echo in 2013, showing an EF of 50% to 55%, mild
-to-moderate left ventricular hypertrophy, and mild aortic stenosis.  I will 
get a repeat echo to see if her EF has decreased further and continue the 
patient on IV diuresis with furosemide 20 mg IV daily and hold the patient's 
home Lasix.  The patient also has an elevated D-dimer.  She is unable to have a 
CTA of her chest due to her renal insufficiency.  I am going to order a V/Q 
scan just to rule out a pulmonary embolus.  Additionally, the patient will be 
started on daily weights, strict I's and O's.  She will be on a low-sodium 
diet.  I am going to ask Dietary to consult on the patient to give her and her 
daughter recommendations for a low-sodium diet.

2.  Hypomagnesemia.  The patient will receive replacement tonight and we will 
recheck her magnesium level in the morning.

3.  Chronic kidney disease stage 4.  The patient's creatinine appears to be at 
baseline.

4.  Diabetes mellitus.  The patient will have glucose checks.  We will continue 
her on her Lantus and place her on a lispro sliding scale, holding her 
metformin while she is hospitalized.

5.  Hypertension.  The patient has been hypertensive in the emergency room.  I 
suspect that this will improve with diuresis and she will be continued on her 
home atenolol/chlorthalidone, lisinopril, and Norvasc.  She may need to have 
her medications adjusted as she is on a lower dosing of them.

6.  Chronic obstructive pulmonary disease with chronic hypoxic respiratory 
failure. The patient has increased oxygen needs.  I suspect she is not a 
chronic obstructive pulmonary disease exacerbation as she does not have any 
wheezing. Continue supplemental oxygen as needed.  I am going to rule out a 
pulmonary embolism as stated above.  The patient is not on any inhalers at home 
and I do not believe she has had a formal chronic obstructive pulmonary disease 
diagnosis, but with her smoking history I suspect she does in fact have chronic 
obstructive pulmonary disease.

7.  Gout.  The patient will be continued on her home allopurinol.

8.  Chronic pain.  The patient will be continued on her home as-needed 
acetaminophen and tramadol as needed.

9.  Fluids, electrolytes and nutrition.  The patient will be on a heart healthy 
diet.

10.  Code status.  Full code.

11.  DVT prophylaxis.  The patient will be placed on full-dose Lovenox (she 
will be on daily dosing due to her renal insufficiency) until we rule out a 
pulmonary embolism and then can be switched to heparin.  I am going to hold on 
giving the patient any SCD's as it is contraindicated in the setting of heart 
failure.

12.  Disposition.  Observation.

 

TIME SPENT:  Time for this admission was approximately 60 minutes; greater than 
half of that was spent with the patient and her daughter discussing medications
, past medical history, and the events leading up to her arrival today and 
performing a physical examination.

 

The case has been reviewed with the attending Dr. Leal, who agrees with the 
plan.

 

Reviewed by SHANE LEMA  05/09/18  0948

 

[id number]/CPS #: [id number]

JOSLYN

## 2018-05-08 NOTE — PN
Hospitalist Progress Note


Date of Service: 05/08/18





called for bradycardia, patient with no symptoms, will hold beta blocker now 

and check ekg.

## 2018-05-08 NOTE — ECHO
Patient:      TREVON VALVERDE Rec#:     [id number]            :          1940          

Date:         2018            Age:          77y                 

Account#:     [id number]          Height:       154.94 cm / 61.0 in

Accession#:   [id number]           Weight:       93.89 kg / 206.9 lbs

Sex:          F                     BSA:          1.92

Room#:        435                   

Admit Date#:  2018          

Type:         Inpatient

 

Referring:    Shanae Cordova NP

Reading:      Esequiel Le MD

Sonographer:  Shanae Ramirez RDCS

CC:           Alhaji Motta MD

______________________________________________________________________

 

Transthoracic Echocardiogram

 

Indication:

CHF, shortness of breath

BP:           153/46

HR:           52

Rhythm:       Bradycardia

 

Findings     

History:

COPD, DM, HTN, HLD, CKD, and gout. 

 

Technical Comments:

The study quality is fair.  

 

Left Ventricle:

The left ventricular chamber size is normal. Moderate concentric left

ventricular hypertrophy is observed. Global left ventricular wall motion

and contractility are within normal limits. Left ventricular systolic

function is at the lower limits of normal.  The estimated ejection

fraction is 50-55%.  There is septal flattening of the interventricular

septum consistent with right ventricular volume or pressure overload. 

Abnormal left ventricular diastolic function is observed. The left

ventricular diastolic filling pattern is consistent with

pseudonormalization.  

 

Left Atrium:

The left atrium is severely dilated. 

 

Right Ventricle:

Moderator Band present. The right ventricle is moderately dilated.  The

right ventricular global systolic function is mildly reduced. 

 

Right Atrium:

The right atrium is moderately dilated.  

 

Aortic Valve:

The aortic valve is trileaflet. The aortic valve leaflets are mildly

thickened. There is evidence of aortic sclerosis without stenosis. There

is no evidence of aortic regurgitation. There is no evidence of aortic

stenosis. 

 

Mitral Valve:

There is posterior mitral annular calcification. The mitral valve

leaflets are mildly thickened. There is mild to moderate mitral

regurgitation.  The mitral regurgitant jet is eccentric. There is no

evidence of mitral stenosis. 

 

Tricuspid Valve:

The tricuspid valve leaflets are normal.  There is mild to moderate

tricuspid regurgitation. The right ventricular systolic pressure is

estimated at 37 mmHg.  There is evidence of mild pulmonary hypertension.

There is no tricuspid stenosis. 

 

Pulmonic Valve:

The pulmonic valve appears normal. There is trace to mild pulmonic

regurgitation. There is no pulmonic stenosis.  

 

Pericardium:

There is no significant pericardial effusion. A pericardial fat pad is

visualized. 

 

Aorta:

There is no dilatation of the ascending aorta. The aortic arch is not

well visualized.  The aortic root is normal in size. 

 

Pulmonary Artery:

The main pulmonary artery is not well visualized. 

 

Venous:

The inferior vena cava appears normal in size. There is a greater than

50% respiratory change in the inferior vena cava dimension. 

 

Summary:

There are no significant changes when compared to the previous study

done on 13 

 

Conclusions

Global left ventricular wall motion and contractility are within normal

limits.

Left ventricular systolic function is at the lower limits of normal. 

The estimated ejection fraction is 50-55%. 

There is septal flattening of the interventricular septum consistent

with right ventricular volume or pressure overload. 

There is evidence of aortic sclerosis without stenosis.

There is mild to moderate mitral regurgitation. 

There is mild to moderate tricuspid regurgitation.

There is evidence of mild pulmonary hypertension.

There is no significant pericardial effusion.

There are no significant changes when compared to the previous study

done on 13

 

Measurements     

Name                    Value         Normal Range            

RVIDd (AP) 2D           3.7 cm        (0.9 - 2.6)             

RVDdMajor (2D)          5.3 cm        (2.2 - 4.4)             

RAd ISD 4CH             5.9 cm        (3.4 - 4.9)             

RA (A4C)W               5.1 cm        (2.9 - 4.6)             

IVSd (2D)               1.4 cm        (0.6 - 1)               

LVPWd (2D)              1.4 cm        (0.6 - 1)               

LVIDd (2D)              4.5 cm        (3.6 - 5.4)             

LVIDs (2D)              3 cm          -                        

LV FS (2D)              32 %          (25 - 45)               

Aortic Annulus          1.6 cm        (1.4 - 2.6)             

Ao root diameter (2D)   2.9 cm        (2.1 - 3.5)             

Ascending Ao            3.2 cm        (2.1 - 3.4)             

LA dimension (AP) 2D    3.7 cm        (2.3 - 3.8)             

LAd ISD 4CH             6.4 cm        (2.9 - 5.3)             

LA ISD 4CH W            5.5 cm        (2.5 - 4.5)             

 

Name                    Value         Normal Range            

LA ESV SP 4CH (A/L)     114 ml        -                        

LA ESV SP 2CH (A/L)     96 ml         -                        

LA ESV BP (A/L)         112 ml        -                        

LA ESV BP (A/L) index   58 ml/m2      -                        

LA ESV SP 4CH (MOD)     112 ml        -                        

LA ESV SP 2CH (MOD)     91 ml         -                        

 

Name                    Value         Normal Range            

MV E-wave Vmax          1.08 m/sec    -                        

MV deceleration time    144.77 msec   -                        

MV A-wave Vmax          0.75 m/sec    -                        

MV E:A ratio            1.44 ratio    -                        

LV septal e' Vmax       0.05 m/sec    -                        

LV lateral e' Vmax      0.05 m/sec    -                        

LV E:e' septal ratio    21.6 ratio    -                        

LV E:e' lateral ratio   21.6 ratio    -                        

 

Name                    Value         Normal Range            

AV Vmax                 1.75 m/sec    -                        

AV VTI                  43.99 cm      -                        

AV peak gradient        12.32 mmHg    -                        

AV mean gradient        6.03 mmHg     -                        

LVOT diameter           2 cm          -                        

LVOT Vmax               1.07 m/sec    -                        

LVOT VTI                29.01 cm      -                        

LVOT peak gradient      4.66 mmHg     -                        

LVOT mean gradient      2.57 mmHg     -                        

KOKO Vmax                0.73 m/sec    -                        

 

Name                    Value         Normal Range            

TR Vmax                 2.9 m/sec     -                        

TR peak gradient        34 mmHg       -                        

RAP                     3 mmHg        -                        

RVSP                    37 mmHg       -                        

IVC diameter            2 cm          -                        

 

Name                    Value         Normal Range            

PV Vmax                 1.32 m/sec    -                        

PV peak gradient        7 mmHg        -                        

 

Electronically signed by: Esequiel Le MD on 2018 16:44:24

## 2018-05-08 NOTE — RAD
INDICATION: Moderate vascular congestion on chest radiographs. Short of breath. Evaluate

for acute pulmonary embolus



COMPARISON: Chest x-ray same date

 

TECHNIQUE: Following the administration of 11.8 millicuries of xenon gas, anterior and

posterior deep breath, equilibrium, and washout phase imaging was performed. Following the

intravenous administration of 6.1 millicuries of technetium 99m, MAA, anterior, posterior,

lateral, and oblique imaging of the chest was performed.



FINDINGS: Ventilation images show essentially normal ventilation. The perfusion images

show no segmentally absent areas of ventilation/perfusion mismatch. 



The probability of acute pulmonary embolus is low.



IMPRESSION:  LOW PROBABILITY FOR ACUTE PULMONARY EMBOLUS. SUGGEST A FOLLOW-UP CHEST X-RAY

TO MONITOR VASCULAR CONGESTION.

## 2018-05-08 NOTE — RAD
Indication: Shortness of breath. On O2 oxygen. Prior tobacco use. COPD.



Comparison: May 07, 2018 chest radiograph. September 21, 2017 CT.



Technique: Sitting AP and lateral chest views.



Report: Morbid obesity limits image quality. Severe cardiomegaly. Prominent ill-defined

central pulmonary vasculature and bilateral alveolar opacities as well as prominence of

the interstitial markings. Probable very small dependent pleural effusions. Negative for

pneumothorax.



IMPRESSION:  The constellation of findings favors pulmonary vascular congestion with

patchy alveolar and interstitial edema. Correlate with clinical assessment as

bronchopneumonia could have a similar appearance.

## 2018-05-08 NOTE — PN
Subjective


Date of Service: 05/08/18


Interval History: 


HOSPITALIST PROGRESS NOTE


Patient seen and examined at bedside.


She feels better today, but still has dyspnea.


Family History: Unchanged from Admission


Social History: Unchanged from Admission


Past Medical History: Unchanged from Admission





Objective


Active Medications: 








Acetaminophen (Tylenol Tab*)  975 mg PO TID PRN


   PRN Reason: FEVER/PAIN


Allopurinol (Zyloprim Tab*)  100 mg PO DAILY [facility]


   Last Admin: 05/08/18 08:43 Dose:  100 mg


Amlodipine Besylate (Norvasc Tab*)  5 mg PO DAILY [facility]


   Last Admin: 05/08/18 08:44 Dose:  5 mg


Atorvastatin Calcium (Lipitor*)  10 mg PO DAILY [facility]


   Last Admin: 05/08/18 08:43 Dose:  10 mg


Calcitriol (Rocaltrol  Cap*)  0.25 mcg PO DAILY [facility]


   Last Admin: 05/08/18 08:43 Dose:  0.25 mcg


Chlorthalidone (Hygroton Tab*)  25 mg PO DAILY [facility]


   Last Admin: 05/08/18 08:44 Dose:  25 mg


Dextrose (D50w Syringe 50 Ml*)  12.5 gm IV PUSH .FOR FS < 60 - SS PRN


   PRN Reason: FS < 60


   Last Admin: 05/08/18 05:48 Dose:  12.5 gm


Furosemide (Lasix Iv*)  40 mg IV SLOW PU DAILY [facility]


   Last Admin: 05/08/18 08:44 Dose:  40 mg


Gabapentin (Neurontin Cap(*))  600 mg PO TID [facility]


   Last Admin: 05/08/18 13:45 Dose:  600 mg


Heparin Sodium (Porcine) (Heparin Vial(*))  5,000 units SUBCUT Q8HR [facility]


Hydralazine HCl (Apresoline Iv*)  5 mg IV SLOW PU Q6H PRN


   PRN Reason: Systolic Bp Greater Than: 180


   Last Admin: 05/07/18 21:08 Dose:  5 mg


Insulin Glargine (Lantus(*))  30 units SUBCUT BID [facility]


   Last Admin: 05/08/18 08:44 Dose:  30 units


Insulin Human Lispro (Humalog*)  0 - 10 units SUBCUT AC [facility]


   PRN Reason: Protocol


   Last Admin: 05/08/18 11:53 Dose:  Not Given


Levothyroxine Sodium (Synthroid Tab*)  75 mcg PO 0600 [facility]


   Last Admin: 05/08/18 05:50 Dose:  75 mcg


Lisinopril (Prinivil Tab*)  20 mg PO DAILY [facility]


   Last Admin: 05/08/18 08:43 Dose:  20 mg


Loperamide HCl (Imodium Cap*)  2 mg PO Q4H PRN


   PRN Reason: DIARRHEA


Nystatin (Nystatin Cream*)  1 applic TOPICAL TID [facility]


   Last Admin: 05/08/18 13:46 Dose:  1 applic


Potassium Chloride (Klor Con Er Tab*)  10 meq PO DAILY [facility]


   Last Admin: 05/08/18 08:44 Dose:  10 meq


Tramadol HCl (Ultram*)  50 mg PO BID [facility]


   Last Admin: 05/08/18 08:49 Dose:  50 mg








 Vital Signs - 8 hr











  05/08/18 05/08/18 05/08/18





  13:41 13:45 15:12


 


Temperature   98.6 F


 


Pulse Rate   46


 


Respiratory  20 18





Rate   


 


Blood Pressure   153/59





(mmHg)   


 


O2 Sat by Pulse 94  99





Oximetry   











Oxygen Devices in Use Now: OxyMask - 8 liters


Appearance: Elderly obese lady sitting up in bed in NAD.


Eyes: No Scleral Icterus


Ears/Nose/Mouth/Throat: Mucous Membranes Moist


Neck: Trachea Midline


Respiratory: Symmetrical Chest Expansion and Respiratory Effort, - - BS+ 

bilaterally with bilateral fine crackles


Cardiovascular: RRR - Normal S1 and S2


Extremities: - - Bilateral LE edema


Neurological: Alert and Oriented x 3, NL Muscle Strength and Tone


Result Diagrams: 


 05/07/18 14:25





 05/08/18 04:50





Assess/Plan/Problems-Billing


Assessment: 


Mrs Muir is a 76yo F with PMH of obesity, type 2 DM, HTN, HLD, COPD on home 

O2, gout, CKD stage 3, who presented to ED with c/o progressive dyspnea, found 

to have probable CHF exacerbation.





- Patient Problems


(1) Acute and chronic respiratory failure


Comment: - Patient was up to 10 liters of O2, now titred down to 8 liters.


- Likely secondary to CHF exacerbation.   





(2) CHF (congestive heart failure)


Comment: - Awaiting echo.


- Continue diuresis.   





(3) D-dimer, elevated


Comment: - V/Q scan showed low probablity of PE.   





(4) HTN (hypertension)


Comment: - Continue Amlodipine and  Lisinopril.   





(5) Bradycardia


Comment: - Asymptomatic.


- Bradycardia and LBBB already present in 2017.   





(6) Diabetes


Comment: - Glucose on the lower side - will decrease Lantus to 10 units and 

continue SS.   





(7) DVT prophylaxis


Comment: - SQ heparin.

## 2018-05-09 RX ADMIN — NYSTATIN SCH: 100000 CREAM TOPICAL at 07:50

## 2018-05-09 RX ADMIN — HEPARIN SODIUM SCH UNITS: 5000 INJECTION INTRAVENOUS; SUBCUTANEOUS at 21:44

## 2018-05-09 RX ADMIN — INSULIN LISPRO SCH: 100 INJECTION, SOLUTION INTRAVENOUS; SUBCUTANEOUS at 07:25

## 2018-05-09 RX ADMIN — POLYETHYLENE GLYCOL 3350 SCH: 17 POWDER, FOR SOLUTION ORAL at 21:44

## 2018-05-09 RX ADMIN — CHLORTHALIDONE SCH MG: 50 TABLET ORAL at 07:49

## 2018-05-09 RX ADMIN — HEPARIN SODIUM SCH UNITS: 5000 INJECTION INTRAVENOUS; SUBCUTANEOUS at 13:47

## 2018-05-09 RX ADMIN — INSULIN GLARGINE SCH UNITS: 100 INJECTION, SOLUTION SUBCUTANEOUS at 07:50

## 2018-05-09 RX ADMIN — INSULIN LISPRO SCH UNITS: 100 INJECTION, SOLUTION INTRAVENOUS; SUBCUTANEOUS at 17:13

## 2018-05-09 RX ADMIN — INSULIN GLARGINE SCH UNITS: 100 INJECTION, SOLUTION SUBCUTANEOUS at 21:43

## 2018-05-09 RX ADMIN — ATORVASTATIN CALCIUM SCH MG: 10 TABLET, FILM COATED ORAL at 07:50

## 2018-05-09 RX ADMIN — HEPARIN SODIUM SCH UNITS: 5000 INJECTION INTRAVENOUS; SUBCUTANEOUS at 05:15

## 2018-05-09 RX ADMIN — NYSTATIN SCH: 100000 CREAM TOPICAL at 13:44

## 2018-05-09 RX ADMIN — ALLOPURINOL SCH MG: 100 TABLET ORAL at 07:50

## 2018-05-09 RX ADMIN — AMLODIPINE BESYLATE SCH MG: 5 TABLET ORAL at 07:49

## 2018-05-09 RX ADMIN — LEVOTHYROXINE SODIUM SCH MCG: 75 TABLET ORAL at 05:14

## 2018-05-09 RX ADMIN — LISINOPRIL SCH MG: 10 TABLET ORAL at 07:48

## 2018-05-09 RX ADMIN — NYSTATIN SCH: 100000 CREAM TOPICAL at 21:43

## 2018-05-09 RX ADMIN — GABAPENTIN SCH MG: 300 CAPSULE ORAL at 13:47

## 2018-05-09 RX ADMIN — GABAPENTIN SCH MG: 300 CAPSULE ORAL at 07:49

## 2018-05-09 RX ADMIN — TRAMADOL HYDROCHLORIDE SCH MG: 50 TABLET, FILM COATED ORAL at 21:43

## 2018-05-09 RX ADMIN — GABAPENTIN SCH MG: 300 CAPSULE ORAL at 21:42

## 2018-05-09 RX ADMIN — POLYETHYLENE GLYCOL 3350 SCH GM: 17 POWDER, FOR SOLUTION ORAL at 09:40

## 2018-05-09 RX ADMIN — FUROSEMIDE SCH MG: 10 INJECTION, SOLUTION INTRAMUSCULAR; INTRAVENOUS at 07:48

## 2018-05-09 RX ADMIN — POTASSIUM CHLORIDE SCH MEQ: 750 TABLET, FILM COATED, EXTENDED RELEASE ORAL at 07:50

## 2018-05-09 RX ADMIN — INSULIN LISPRO SCH UNITS: 100 INJECTION, SOLUTION INTRAVENOUS; SUBCUTANEOUS at 12:27

## 2018-05-09 RX ADMIN — TRAMADOL HYDROCHLORIDE SCH MG: 50 TABLET, FILM COATED ORAL at 07:49

## 2018-05-09 RX ADMIN — CALCITRIOL SCH MCG: 0.25 CAPSULE ORAL at 07:49

## 2018-05-09 NOTE — PN
Subjective


Date of Service: 05/09/18


Interval History: 


HOSPITALIST PROGRESS NOTE


Patient seen and examined at bedside. Care reviewed and d/w Shaunna Waldrop RN.


She feels a little better. She felt "foggy" overnight while on 6 liters of O2 

and improved with 8 liters. Dyspnea is less intense and she denies chest pain 

or lightheadedness.


Family History: Unchanged from Admission


Social History: Unchanged from Admission


Past Medical History: Unchanged from Admission





Objective


Active Medications: 








Acetaminophen (Tylenol Tab*)  975 mg PO TID PRN


   PRN Reason: FEVER/PAIN


Allopurinol (Zyloprim Tab*)  [facility] mg [facility]ILY [facility]


   Last Admin: 05/09/18 07:50 Dose:  100 mg


Amlodipine Besylate (Norvasc Tab*)  5 mg PO DAILY [facility]


   Last Admin: 05/09/18 07:49 Dose:  5 mg


Atorvastatin Calcium (Lipitor*)  10 mg PO DAILY [facility]


   Last Admin: 05/09/18 07:50 Dose:  10 mg


Calcitriol (Rocaltrol  Cap*)  0.25 mcg PO DAILY [facility]


   Last Admin: 05/09/18 07:49 Dose:  0.25 mcg


Chlorthalidone (Hygroton Tab*)  25 mg PO DAILY [facility]


   Last Admin: 05/09/18 07:49 Dose:  25 mg


Dextrose (D50w Syringe 50 Ml*)  12.5 gm IV PUSH .FOR FS < 60 - SS PRN


   PRN Reason: FS < 60


   Last Admin: 05/08/18 05:48 Dose:  12.5 gm


Docusate Sodium (Colace Cap*)  100 mg PO BID PRN


   PRN Reason: CONSTIPATION


Furosemide (Lasix Iv*)  40 mg IV SLOW PU DAILY [facility]


   Last Admin: 05/09/18 07:48 Dose:  40 mg


Gabapentin (Neurontin Cap(*))  600 mg PO TID [facility]


   Last Admin: 05/09/18 13:47 Dose:  600 mg


Heparin Sodium (Porcine) (Heparin Vial(*))  5,000 units SUBCUT Q8HR [facility]


   Last Admin: 05/09/18 13:47 Dose:  5,000 units


Hydralazine HCl (Apresoline Iv*)  5 mg IV SLOW PU Q6H PRN


   PRN Reason: Systolic Bp Greater Than: 180


   Last Admin: 05/07/18 21:08 Dose:  5 mg


Insulin Glargine (Lantus(*))  10 units SUBCUT BEDTIME [facility]


Insulin Human Lispro (Humalog*)  0 - 10 units SUBCUT AC [facility]


   PRN Reason: Protocol


   Last Admin: 05/09/18 12:27 Dose:  2 units


Levothyroxine Sodium (Synthroid Tab*)  75 mcg PO 0600 [facility]


   Last Admin: 05/09/18 05:14 Dose:  75 mcg


Lisinopril (Prinivil Tab*)  20 mg PO DAILY [facility]


   Last Admin: 05/09/18 07:48 Dose:  20 mg


Loperamide HCl (Imodium Cap*)  2 mg PO Q4H PRN


   PRN Reason: DIARRHEA


Nystatin (Nystatin Cream*)  1 applic TOPICAL TID [facility]


   Last Admin: 05/09/18 13:44 Dose:  Not Given


Polyethylene Glycol/Electrolytes (Miralax*)  17 gm PO 0800,2100 [facility]


   Last Admin: 05/09/18 09:40 Dose:  17 gm


Potassium Chloride (Klor Con Er Tab*)  10 meq PO DAILY [facility]


   Last Admin: 05/09/18 07:50 Dose:  10 meq


Senna (Senokot Tab*)  2 tab PO BEDTIME PRN


   PRN Reason: CONSTIPATION


Tramadol HCl (Ultram*)  50 mg PO BID [facility]


   Last Admin: 05/09/18 07:49 Dose:  50 mg








 Vital Signs - 8 hr











  05/09/18 05/09/18 05/09/18





  09:36 11:38 13:47


 


Temperature  97.5 F 


 


Pulse Rate  50 


 


Respiratory 20 20 18





Rate   


 


Blood Pressure  138/50 





(mmHg)   


 


O2 Sat by Pulse  100 





Oximetry   














  05/09/18





  15:37


 


Temperature 


 


Pulse Rate 


 


Respiratory 20





Rate 


 


Blood Pressure 





(mmHg) 


 


O2 Sat by Pulse 





Oximetry 











Oxygen Devices in Use Now: Simple Face Mask - 6 liters


Appearance: Elderly obese lady sitting up in a chair in NAD.


Eyes: No Scleral Icterus


Ears/Nose/Mouth/Throat: Mucous Membranes Moist


Neck: Trachea Midline


Respiratory: Symmetrical Chest Expansion and Respiratory Effort, - - BS+ 

bilaterally with bilateral rales from midfields down


Cardiovascular: RRR - Normal S1 and S2


Extremities: - - Bilateral LE pitting edema


Neurological: Alert and Oriented x 3, NL Muscle Strength and Tone


Result Diagrams: 


 05/07/18 14:25





 05/09/18 04:36





Assess/Plan/Problems-Billing


Assessment: 


Mrs Muir is a 78yo F with PMH of obesity, type 2 DM, HTN, HLD, COPD on home 

O2, gout, CKD stage 3, who presented to ED with c/o progressive dyspnea, found 

to have probable CHF exacerbation.





- Patient Problems


(1) Acute and chronic respiratory failure


Comment: - Patient was up to 10 liters of O2, now titred down to 8 liters - 

continue to titrate down - goal SO2 91%.


- Secondary to CHF exacerbation.   





(2) CHF (congestive heart failure)


Comment: - Acute diastolic CHF exacerbation secondary to dietary non compliance.


- Echo showed EF 50-55% with no significant changes from 2013.


- Continue diuresis.   





(3) D-dimer, elevated


Comment: - V/Q scan showed low probablity of PE.   





(4) HTN (hypertension)


Comment: - Continue Amlodipine and  Lisinopril.   





(5) Bradycardia


Comment: - Asymptomatic.


- Bradycardia and LBBB already present in 2017.


- Patient not interested in pacemaker.   





(6) Diabetes


Comment: - Glucose on the lower side - will decrease Lantus to 10 units a day 

and continue SS.


- A1c 5.7.   





(7) DVT prophylaxis


Comment: - SQ heparin.

## 2018-05-10 RX ADMIN — POLYETHYLENE GLYCOL 3350 SCH GM: 17 POWDER, FOR SOLUTION ORAL at 08:15

## 2018-05-10 RX ADMIN — HEPARIN SODIUM SCH UNITS: 5000 INJECTION INTRAVENOUS; SUBCUTANEOUS at 05:02

## 2018-05-10 RX ADMIN — TRAMADOL HYDROCHLORIDE SCH MG: 50 TABLET, FILM COATED ORAL at 21:25

## 2018-05-10 RX ADMIN — ATORVASTATIN CALCIUM SCH MG: 10 TABLET, FILM COATED ORAL at 08:22

## 2018-05-10 RX ADMIN — LEVOTHYROXINE SODIUM SCH MCG: 75 TABLET ORAL at 05:01

## 2018-05-10 RX ADMIN — POTASSIUM CHLORIDE SCH MEQ: 750 TABLET, FILM COATED, EXTENDED RELEASE ORAL at 08:22

## 2018-05-10 RX ADMIN — INSULIN LISPRO SCH UNITS: 100 INJECTION, SOLUTION INTRAVENOUS; SUBCUTANEOUS at 12:26

## 2018-05-10 RX ADMIN — INSULIN LISPRO SCH UNITS: 100 INJECTION, SOLUTION INTRAVENOUS; SUBCUTANEOUS at 08:14

## 2018-05-10 RX ADMIN — HEPARIN SODIUM SCH UNITS: 5000 INJECTION INTRAVENOUS; SUBCUTANEOUS at 21:24

## 2018-05-10 RX ADMIN — GABAPENTIN SCH MG: 300 CAPSULE ORAL at 13:09

## 2018-05-10 RX ADMIN — FUROSEMIDE SCH MG: 10 INJECTION, SOLUTION INTRAMUSCULAR; INTRAVENOUS at 08:17

## 2018-05-10 RX ADMIN — NYSTATIN SCH: 100000 CREAM TOPICAL at 21:26

## 2018-05-10 RX ADMIN — NYSTATIN SCH: 100000 CREAM TOPICAL at 08:06

## 2018-05-10 RX ADMIN — GABAPENTIN SCH MG: 300 CAPSULE ORAL at 21:24

## 2018-05-10 RX ADMIN — HEPARIN SODIUM SCH UNITS: 5000 INJECTION INTRAVENOUS; SUBCUTANEOUS at 13:09

## 2018-05-10 RX ADMIN — TRAMADOL HYDROCHLORIDE SCH MG: 50 TABLET, FILM COATED ORAL at 08:23

## 2018-05-10 RX ADMIN — INSULIN GLARGINE SCH UNITS: 100 INJECTION, SOLUTION SUBCUTANEOUS at 21:24

## 2018-05-10 RX ADMIN — POLYETHYLENE GLYCOL 3350 SCH: 17 POWDER, FOR SOLUTION ORAL at 21:26

## 2018-05-10 RX ADMIN — INSULIN LISPRO SCH UNITS: 100 INJECTION, SOLUTION INTRAVENOUS; SUBCUTANEOUS at 17:18

## 2018-05-10 RX ADMIN — CALCITRIOL SCH MCG: 0.25 CAPSULE ORAL at 08:21

## 2018-05-10 RX ADMIN — CHLORTHALIDONE SCH MG: 50 TABLET ORAL at 08:21

## 2018-05-10 RX ADMIN — AMLODIPINE BESYLATE SCH MG: 5 TABLET ORAL at 08:23

## 2018-05-10 RX ADMIN — NYSTATIN SCH: 100000 CREAM TOPICAL at 13:04

## 2018-05-10 RX ADMIN — GABAPENTIN SCH MG: 300 CAPSULE ORAL at 08:21

## 2018-05-10 RX ADMIN — LISINOPRIL SCH MG: 10 TABLET ORAL at 08:21

## 2018-05-10 RX ADMIN — ALLOPURINOL SCH MG: 100 TABLET ORAL at 08:22

## 2018-05-10 NOTE — PN
Subjective


Date of Service: 05/10/18


Interval History: 


HOSPITALIST PROGRESS NOTE


Patient seen and examined at bedside.


She feels better today, breathing is getting closer to baseline on 5 liters of 

O2. Denies CP and palpitations.


Family History: Unchanged from Admission


Social History: Unchanged from Admission


Past Medical History: Unchanged from Admission





Objective


Active Medications: 








Acetaminophen (Tylenol Tab*)  975 mg PO TID PRN


   PRN Reason: FEVER/PAIN


Allopurinol (Zyloprim Tab*)  100 mg PO DAILY [facility]


   Last Admin: 05/10/18 08:22 Dose:  100 mg


Amlodipine Besylate (Norvasc Tab*)  5 mg PO DAILY [facility]


   Last Admin: 05/10/18 08:23 Dose:  5 mg


Atorvastatin Calcium (Lipitor*)  10 mg PO DAILY [facility]


   Last Admin: 05/10/18 08:22 Dose:  10 mg


Calcitriol (Rocaltrol  Cap*)  0.25 mcg PO DAILY [facility]


   Last Admin: 05/10/18 08:21 Dose:  0.25 mcg


Chlorthalidone (Hygroton Tab*)  25 mg PO DAILY [facility]


   Last Admin: 05/10/18 08:21 Dose:  25 mg


Dextrose (D50w Syringe 50 Ml*)  12.5 gm IV PUSH .FOR FS < 60 - SS PRN


   PRN Reason: FS < 60


   Last Admin: 05/08/18 05:48 Dose:  12.5 gm


Docusate Sodium (Colace Cap*)  100 mg PO BID PRN


   PRN Reason: CONSTIPATION


Furosemide (Lasix Iv*)  40 mg IV SLOW PU DAILY [facility]


   Last Admin: 05/10/18 08:17 Dose:  40 mg


Gabapentin (Neurontin Cap(*))  600 mg PO TID [facility]


   Last Admin: 05/10/18 13:09 Dose:  600 mg


Heparin Sodium (Porcine) (Heparin Vial(*))  5,000 units SUBCUT Q8HR [facility]


   Last Admin: 05/10/18 13:09 Dose:  5,000 units


Hydralazine HCl (Apresoline Iv*)  5 mg IV SLOW PU Q6H PRN


   PRN Reason: Systolic Bp Greater Than: 180


   Last Admin: 05/07/18 21:08 Dose:  5 mg


Insulin Glargine (Lantus(*))  10 units SUBCUT BEDTIME [facility]


   Last Admin: 05/09/18 21:43 Dose:  10 units


Insulin Human Lispro (Humalog*)  0 - 10 units SUBCUT AC [facility]


   PRN Reason: Protocol


   Last Admin: 05/10/18 12:26 Dose:  2 units


Levothyroxine Sodium (Synthroid Tab*)  75 mcg PO 0600 [facility]


   Last Admin: 05/10/18 05:01 Dose:  75 mcg


Lisinopril (Prinivil Tab*)  20 mg PO DAILY [facility]


   Last Admin: 05/10/18 08:21 Dose:  20 mg


Loperamide HCl (Imodium Cap*)  2 mg PO Q4H PRN


   PRN Reason: DIARRHEA


Nystatin (Nystatin Cream*)  1 applic TOPICAL TID [facility]


   Last Admin: 05/10/18 13:04 Dose:  Not Given


Polyethylene Glycol/Electrolytes (Miralax*)  17 gm PO 0800,2100 [facility]


   Last Admin: 05/10/18 08:15 Dose:  17 gm


Potassium Chloride (Klor Con Er Tab*)  10 meq PO DAILY [facility]


   Last Admin: 05/10/18 08:22 Dose:  10 meq


Senna (Senokot Tab*)  2 tab PO BEDTIME PRN


   PRN Reason: CONSTIPATION


Tramadol HCl (Ultram*)  50 mg PO BID [facility]


   Last Admin: 05/10/18 08:23 Dose:  50 mg








 Vital Signs - 8 hr











  05/10/18 05/10/18 05/10/18





  07:38 08:00 08:21


 


Temperature 97.6 F  


 


Pulse Rate 48  


 


Respiratory 20 18 18





Rate   


 


Blood Pressure 164/47  





(mmHg)   


 


O2 Sat by Pulse 98  





Oximetry   














  05/10/18 05/10/18 05/10/18





  08:23 11:38 13:09


 


Temperature  98.7 F 


 


Pulse Rate  50 


 


Respiratory 18 20 18





Rate   


 


Blood Pressure  163/55 





(mmHg)   


 


O2 Sat by Pulse  95 





Oximetry   











Oxygen Devices in Use Now: OxyMask - 5 liters


Appearance: Elderly lady sitting up in a chair in NAD.


Eyes: No Scleral Icterus


Ears/Nose/Mouth/Throat: Mucous Membranes Moist


Neck: Trachea Midline


Respiratory: Symmetrical Chest Expansion and Respiratory Effort, - - BS+ 

bilaterally with bibasilar crackles


Cardiovascular: RRR - Normal S1 and S2


Neurological: Alert and Oriented x 3, NL Muscle Strength and Tone


Result Diagrams: 


 05/07/18 14:25





 05/10/18 06:22





Assess/Plan/Problems-Billing


Assessment: 


Mrs Muir is a 76yo F with PMH of obesity, type 2 DM, HTN, HLD, COPD on home 

O2, gout, CKD stage 3, who presented to ED with c/o progressive dyspnea, found 

to have probable CHF exacerbation.





- Patient Problems


(1) Acute and chronic respiratory failure


Comment: - Patient was on 10 liters of O2 on admission, now titred down to 5 

liters - continue to titrate down - goal SO2 around 91%.


- Secondary to CHF exacerbation.   





(2) CHF (congestive heart failure)


Comment: - Acute diastolic CHF exacerbation secondary to dietary non compliance.


- Echo showed EF 50-55% with no significant changes from 2013.


- Continue diuresis.   





(3) D-dimer, elevated


Comment: - V/Q scan showed low probablity of PE.   





(4) HTN (hypertension)


Comment: - Continue Amlodipine and  Lisinopril.   





(5) Bradycardia


Comment: - Asymptomatic.


- Bradycardia and LBBB already present in 2017.


- Patient not interested in pacemaker.   





(6) Diabetes


Comment: - Glucose on the lower side - will decrease Lantus to 10 units a day 

and continue SS.


- A1c 5.7.   





(7) DVT prophylaxis


Comment: - SQ heparin.

## 2018-05-11 RX ADMIN — HEPARIN SODIUM SCH UNITS: 5000 INJECTION INTRAVENOUS; SUBCUTANEOUS at 05:55

## 2018-05-11 RX ADMIN — INSULIN GLARGINE SCH UNITS: 100 INJECTION, SOLUTION SUBCUTANEOUS at 20:43

## 2018-05-11 RX ADMIN — NYSTATIN SCH: 100000 CREAM TOPICAL at 20:45

## 2018-05-11 RX ADMIN — NYSTATIN SCH: 100000 CREAM TOPICAL at 13:25

## 2018-05-11 RX ADMIN — POLYETHYLENE GLYCOL 3350 SCH GM: 17 POWDER, FOR SOLUTION ORAL at 09:12

## 2018-05-11 RX ADMIN — ALLOPURINOL SCH MG: 100 TABLET ORAL at 09:13

## 2018-05-11 RX ADMIN — NYSTATIN SCH APPLIC: 100000 CREAM TOPICAL at 22:00

## 2018-05-11 RX ADMIN — TRAMADOL HYDROCHLORIDE SCH MG: 50 TABLET, FILM COATED ORAL at 20:28

## 2018-05-11 RX ADMIN — POTASSIUM CHLORIDE SCH MEQ: 750 TABLET, FILM COATED, EXTENDED RELEASE ORAL at 09:13

## 2018-05-11 RX ADMIN — INSULIN LISPRO SCH UNITS: 100 INJECTION, SOLUTION INTRAVENOUS; SUBCUTANEOUS at 11:52

## 2018-05-11 RX ADMIN — IPRATROPIUM BROMIDE AND ALBUTEROL SULFATE PRN NEB: .5; 3 SOLUTION RESPIRATORY (INHALATION) at 10:28

## 2018-05-11 RX ADMIN — AMLODIPINE BESYLATE SCH MG: 5 TABLET ORAL at 09:13

## 2018-05-11 RX ADMIN — POLYETHYLENE GLYCOL 3350 SCH: 17 POWDER, FOR SOLUTION ORAL at 20:29

## 2018-05-11 RX ADMIN — GABAPENTIN SCH MG: 300 CAPSULE ORAL at 09:12

## 2018-05-11 RX ADMIN — HYDRALAZINE HYDROCHLORIDE PRN MG: 20 INJECTION INTRAMUSCULAR; INTRAVENOUS at 20:32

## 2018-05-11 RX ADMIN — TRAMADOL HYDROCHLORIDE SCH MG: 50 TABLET, FILM COATED ORAL at 09:14

## 2018-05-11 RX ADMIN — NYSTATIN SCH: 100000 CREAM TOPICAL at 09:46

## 2018-05-11 RX ADMIN — FUROSEMIDE SCH MG: 10 INJECTION, SOLUTION INTRAMUSCULAR; INTRAVENOUS at 09:13

## 2018-05-11 RX ADMIN — GABAPENTIN SCH MG: 300 CAPSULE ORAL at 13:37

## 2018-05-11 RX ADMIN — GABAPENTIN SCH MG: 300 CAPSULE ORAL at 20:27

## 2018-05-11 RX ADMIN — INSULIN LISPRO SCH UNITS: 100 INJECTION, SOLUTION INTRAVENOUS; SUBCUTANEOUS at 09:11

## 2018-05-11 RX ADMIN — LEVOTHYROXINE SODIUM SCH MCG: 75 TABLET ORAL at 05:54

## 2018-05-11 RX ADMIN — INSULIN LISPRO SCH UNITS: 100 INJECTION, SOLUTION INTRAVENOUS; SUBCUTANEOUS at 17:27

## 2018-05-11 RX ADMIN — HEPARIN SODIUM SCH UNITS: 5000 INJECTION INTRAVENOUS; SUBCUTANEOUS at 20:40

## 2018-05-11 RX ADMIN — HEPARIN SODIUM SCH UNITS: 5000 INJECTION INTRAVENOUS; SUBCUTANEOUS at 13:36

## 2018-05-11 RX ADMIN — HYDRALAZINE HYDROCHLORIDE PRN MG: 20 INJECTION INTRAMUSCULAR; INTRAVENOUS at 09:14

## 2018-05-11 RX ADMIN — CALCITRIOL SCH MCG: 0.25 CAPSULE ORAL at 09:55

## 2018-05-11 RX ADMIN — CHLORTHALIDONE SCH MG: 50 TABLET ORAL at 09:55

## 2018-05-11 RX ADMIN — ATORVASTATIN CALCIUM SCH MG: 10 TABLET, FILM COATED ORAL at 09:13

## 2018-05-11 RX ADMIN — LISINOPRIL SCH MG: 10 TABLET ORAL at 09:13

## 2018-05-11 NOTE — PN
Subjective


Date of Service: 05/11/18


Interval History: 


HOSPITALIST PROGRESS NOTE


Patient seen and examined at bedside multiple times today.


She states she slept well, but has had recurrent dyspnea since breakfast. Was 

on 4 liters of O2, now requires 7 liters. BP was also elevated. Denies CP or 

palpitations.


Family History: Unchanged from Admission


Social History: Unchanged from Admission


Past Medical History: Unchanged from Admission





Objective


Active Medications: 








Acetaminophen (Tylenol Tab*)  975 mg PO TID PRN


   PRN Reason: FEVER/PAIN


Albuterol/Ipratropium (Duoneb (Albuterol 2.5 Mg/Ipratropium 0.5 Mg))  1 neb INH 

Q4H PRN


   PRN Reason: SOB/WHEEZING


   Last Admin: 05/11/18 10:28 Dose:  1 neb


Allopurinol (Zyloprim Tab*)  100 mg PO DAILY [facility]


   Last Admin: 05/11/18 09:13 Dose:  100 mg


Amlodipine Besylate (Norvasc Tab*)  10 mg PO DAILY [facility]


Atorvastatin Calcium (Lipitor*)  10 mg PO DAILY [facility]


   Last Admin: 05/11/18 09:13 Dose:  10 mg


Calcitriol (Rocaltrol  Cap*)  0.25 mcg PO DAILY [facility]


   Last Admin: 05/11/18 09:55 Dose:  0.25 mcg


Chlorthalidone (Hygroton Tab*)  25 mg PO DAILY [facility]


   Last Admin: 05/11/18 09:55 Dose:  25 mg


Dextrose (D50w Syringe 50 Ml*)  12.5 gm IV PUSH .FOR FS < 60 - SS PRN


   PRN Reason: FS < 60


   Last Admin: 05/08/18 05:48 Dose:  12.5 gm


Docusate Sodium (Colace Cap*)  100 mg PO BID PRN


   PRN Reason: CONSTIPATION


Furosemide (Lasix Iv*)  40 mg IV SLOW PU DAILY [facility]


   Last Admin: 05/11/18 09:13 Dose:  40 mg


Gabapentin (Neurontin Cap(*))  600 mg PO TID [facility]


   Last Admin: 05/11/18 13:37 Dose:  600 mg


Heparin Sodium (Porcine) (Heparin Vial(*))  5,000 units SUBCUT Q8HR [facility]


   Last Admin: 05/11/18 13:36 Dose:  5,000 units


Hydralazine HCl (Apresoline Iv*)  5 mg IV SLOW PU Q6H PRN


   PRN Reason: Systolic Bp Greater Than: 180


   Last Admin: 05/11/18 09:14 Dose:  5 mg


Insulin Glargine (Lantus(*))  10 units SUBCUT BEDTIME [facility]


   Last Admin: 05/10/18 21:24 Dose:  10 units


Insulin Human Lispro (Humalog*)  0 - 10 units SUBCUT AC [facility]


   PRN Reason: Protocol


   Last Admin: 05/11/18 11:52 Dose:  4 units


Levothyroxine Sodium (Synthroid Tab*)  75 mcg PO 0600 [facility]


   Last Admin: 05/11/18 05:54 Dose:  75 mcg


Lisinopril (Prinivil Tab*)  20 mg PO DAILY [facility]


   Last Admin: 05/11/18 09:13 Dose:  20 mg


Loperamide HCl (Imodium Cap*)  2 mg PO Q4H PRN


   PRN Reason: DIARRHEA


Nystatin (Nystatin Cream*)  1 applic TOPICAL TID [facility]


   Last Admin: 05/11/18 13:25 Dose:  Not Given


Polyethylene Glycol/Electrolytes (Miralax*)  17 gm PO 0800,2100 [facility]


   Last Admin: 05/11/18 09:12 Dose:  17 gm


Potassium Chloride (Klor Con Er Tab*)  10 meq PO DAILY [facility]


   Last Admin: 05/11/18 09:13 Dose:  10 meq


Senna (Senokot Tab*)  2 tab PO BEDTIME PRN


   PRN Reason: CONSTIPATION


Tramadol HCl (Ultram*)  50 mg PO BID [facility]


   Last Admin: 05/11/18 09:14 Dose:  50 mg








 Vital Signs - 8 hr











  05/11/18 05/11/18 05/11/18





  07:39 08:11 09:12


 


Temperature  97.4 F 


 


Pulse Rate  59 


 


Respiratory 20 24 18





Rate   


 


Blood Pressure  190/65 





(mmHg)   


 


O2 Sat by Pulse  97 





Oximetry   














  05/11/18 05/11/18 05/11/18





  09:14 10:30 12:27


 


Temperature   97.8 F


 


Pulse Rate  103 58


 


Respiratory 18 22 24





Rate   


 


Blood Pressure  182/70 171/60





(mmHg)   


 


O2 Sat by Pulse  94 91





Oximetry   














  05/11/18





  13:37


 


Temperature 


 


Pulse Rate 


 


Respiratory 18





Rate 


 


Blood Pressure 





(mmHg) 


 


O2 Sat by Pulse 





Oximetry 











Oxygen Devices in Use Now: OxyMask - 7 liters


Appearance: Elderly obese sitting up in a recliner in [facility].


Eyes: No Scleral Icterus


Ears/Nose/Mouth/Throat: Mucous Membranes Moist


Neck: Trachea Midline


Respiratory: Symmetrical Chest Expansion and Respiratory Effort, - - BS+ 

bilaterally with scattered wheezing and bibasilar crackles. After breathing 

treatment, wheezes resolved.


Cardiovascular: RRR - Normal S1 and S2


Abdominal: NL Sounds; No Tenderness; No Distention - obese


Neurological: Alert and Oriented x 3, NL Muscle Strength and Tone


Result Diagrams: 


 05/07/18 14:25





 05/11/18 05:05





Assess/Plan/Problems-Billing


Assessment: 


Mrs Muir is a 78yo F with PMH of obesity, type 2 DM, HTN, HLD, COPD on home 

O2, gout, CKD stage 3, who presented to ED with c/o progressive dyspnea, found 

to have probable CHF exacerbation.





- Patient Problems


(1) Acute and chronic respiratory failure


Comment: - Had worsening of dyspnea this AM, now up to 7 liters of O2 again.


- Secondary to CHF exacerbation.   





(2) CHF (congestive heart failure)


Comment: - Acute diastolic CHF exacerbation secondary to dietary non compliance.


- Echo showed EF 50-55% with no significant changes from 2013.


- Weight went up again today, bu she denies dietary non compliance ( had kit 

delisa bars in her table, but denies eating salty foods).


- Will give higher dose Furosemide today and monitor.   





(3) HTN (hypertension)


Comment: - Elevated today - will increase Amlodipine and Lisinopril - monitor. 

  





(4) D-dimer, elevated


Comment: - V/Q scan showed low probablity of PE.   





(5) Bradycardia


Comment: - Asymptomatic.


- Bradycardia and LBBB already present in 2017.


- Patient not interested in any aggressive measures, including pacemaker.   





(6) Diabetes


Comment: - Requiring much lower dose of Lantus while in the hospital, but had 

glucose spike last night, likely secondary to chocolate bar.


- A1c 5.7.   





(7) DVT prophylaxis


Comment: - SQ heparin.

## 2018-05-12 RX ADMIN — NYSTATIN SCH: 100000 CREAM TOPICAL at 13:09

## 2018-05-12 RX ADMIN — GABAPENTIN SCH MG: 300 CAPSULE ORAL at 20:02

## 2018-05-12 RX ADMIN — TRAMADOL HYDROCHLORIDE SCH MG: 50 TABLET, FILM COATED ORAL at 20:03

## 2018-05-12 RX ADMIN — CALCITRIOL SCH MCG: 0.25 CAPSULE ORAL at 09:00

## 2018-05-12 RX ADMIN — INSULIN LISPRO SCH UNITS: 100 INJECTION, SOLUTION INTRAVENOUS; SUBCUTANEOUS at 17:34

## 2018-05-12 RX ADMIN — FUROSEMIDE SCH MG: 10 INJECTION, SOLUTION INTRAMUSCULAR; INTRAVENOUS at 09:00

## 2018-05-12 RX ADMIN — ATORVASTATIN CALCIUM SCH MG: 10 TABLET, FILM COATED ORAL at 09:03

## 2018-05-12 RX ADMIN — LEVOTHYROXINE SODIUM SCH MCG: 75 TABLET ORAL at 05:35

## 2018-05-12 RX ADMIN — GABAPENTIN SCH MG: 300 CAPSULE ORAL at 09:00

## 2018-05-12 RX ADMIN — TRAMADOL HYDROCHLORIDE SCH MG: 50 TABLET, FILM COATED ORAL at 09:02

## 2018-05-12 RX ADMIN — HEPARIN SODIUM SCH UNITS: 5000 INJECTION INTRAVENOUS; SUBCUTANEOUS at 20:29

## 2018-05-12 RX ADMIN — NYSTATIN SCH: 100000 CREAM TOPICAL at 20:11

## 2018-05-12 RX ADMIN — ALLOPURINOL SCH MG: 100 TABLET ORAL at 09:00

## 2018-05-12 RX ADMIN — CHLORTHALIDONE SCH MG: 50 TABLET ORAL at 09:01

## 2018-05-12 RX ADMIN — POLYETHYLENE GLYCOL 3350 SCH: 17 POWDER, FOR SOLUTION ORAL at 20:12

## 2018-05-12 RX ADMIN — INSULIN LISPRO SCH UNITS: 100 INJECTION, SOLUTION INTRAVENOUS; SUBCUTANEOUS at 13:05

## 2018-05-12 RX ADMIN — IPRATROPIUM BROMIDE AND ALBUTEROL SULFATE PRN NEB: .5; 3 SOLUTION RESPIRATORY (INHALATION) at 22:52

## 2018-05-12 RX ADMIN — HEPARIN SODIUM SCH UNITS: 5000 INJECTION INTRAVENOUS; SUBCUTANEOUS at 05:24

## 2018-05-12 RX ADMIN — POTASSIUM CHLORIDE SCH MEQ: 750 TABLET, FILM COATED, EXTENDED RELEASE ORAL at 09:03

## 2018-05-12 RX ADMIN — AMLODIPINE BESYLATE SCH MG: 5 TABLET ORAL at 09:03

## 2018-05-12 RX ADMIN — HEPARIN SODIUM SCH UNITS: 5000 INJECTION INTRAVENOUS; SUBCUTANEOUS at 13:05

## 2018-05-12 RX ADMIN — GABAPENTIN SCH MG: 300 CAPSULE ORAL at 13:05

## 2018-05-12 RX ADMIN — LISINOPRIL SCH MG: 10 TABLET ORAL at 09:01

## 2018-05-12 RX ADMIN — HYDRALAZINE HYDROCHLORIDE PRN MG: 20 INJECTION INTRAMUSCULAR; INTRAVENOUS at 20:08

## 2018-05-12 RX ADMIN — NYSTATIN SCH APPLIC: 100000 CREAM TOPICAL at 09:04

## 2018-05-12 RX ADMIN — INSULIN GLARGINE SCH UNITS: 100 INJECTION, SOLUTION SUBCUTANEOUS at 20:29

## 2018-05-12 RX ADMIN — NYSTATIN SCH APPLIC: 100000 CREAM TOPICAL at 21:01

## 2018-05-12 RX ADMIN — INSULIN LISPRO SCH UNITS: 100 INJECTION, SOLUTION INTRAVENOUS; SUBCUTANEOUS at 09:04

## 2018-05-12 RX ADMIN — POLYETHYLENE GLYCOL 3350 SCH: 17 POWDER, FOR SOLUTION ORAL at 09:04

## 2018-05-13 VITALS — SYSTOLIC BLOOD PRESSURE: 154 MMHG | DIASTOLIC BLOOD PRESSURE: 41 MMHG

## 2018-05-13 LAB
BASOPHILS # BLD AUTO: 0 10^3/UL (ref 0–0.2)
EOSINOPHIL # BLD AUTO: 0.7 10^3/UL (ref 0–0.6)
HCT VFR BLD AUTO: 31 % (ref 35–47)
HGB BLD-MCNC: 10.3 G/DL (ref 12–16)
LYMPHOCYTES # BLD AUTO: 1.6 10^3/UL (ref 1–4.8)
MCH RBC QN AUTO: 34 PG (ref 27–31)
MCHC RBC AUTO-ENTMCNC: 33 G/DL (ref 31–36)
MCV RBC AUTO: 102 FL (ref 80–97)
MONOCYTES # BLD AUTO: 1.3 10^3/UL (ref 0–0.8)
NEUTROPHILS # BLD AUTO: 6.7 10^3/UL (ref 1.5–7.7)
NRBC # BLD AUTO: 0 10^3/UL
NRBC BLD QL AUTO: 0
PLATELET # BLD AUTO: 178 10^3/UL (ref 150–450)
RBC # BLD AUTO: 3.07 10^6/UL (ref 4–5.4)
WBC # BLD AUTO: 10.3 10^3/UL (ref 3.5–10.8)

## 2018-05-13 RX ADMIN — CHLORTHALIDONE SCH MG: 50 TABLET ORAL at 07:50

## 2018-05-13 RX ADMIN — HEPARIN SODIUM SCH UNITS: 5000 INJECTION INTRAVENOUS; SUBCUTANEOUS at 05:11

## 2018-05-13 RX ADMIN — GABAPENTIN SCH MG: 300 CAPSULE ORAL at 13:12

## 2018-05-13 RX ADMIN — LISINOPRIL SCH MG: 10 TABLET ORAL at 07:49

## 2018-05-13 RX ADMIN — FUROSEMIDE SCH MG: 10 INJECTION, SOLUTION INTRAMUSCULAR; INTRAVENOUS at 07:49

## 2018-05-13 RX ADMIN — LEVOTHYROXINE SODIUM SCH MCG: 75 TABLET ORAL at 05:11

## 2018-05-13 RX ADMIN — ALLOPURINOL SCH MG: 100 TABLET ORAL at 07:49

## 2018-05-13 RX ADMIN — TRAMADOL HYDROCHLORIDE SCH MG: 50 TABLET, FILM COATED ORAL at 07:50

## 2018-05-13 RX ADMIN — AMLODIPINE BESYLATE SCH MG: 5 TABLET ORAL at 07:49

## 2018-05-13 RX ADMIN — POTASSIUM CHLORIDE SCH MEQ: 750 TABLET, FILM COATED, EXTENDED RELEASE ORAL at 07:50

## 2018-05-13 RX ADMIN — GABAPENTIN SCH MG: 300 CAPSULE ORAL at 07:49

## 2018-05-13 RX ADMIN — NYSTATIN SCH: 100000 CREAM TOPICAL at 11:39

## 2018-05-13 RX ADMIN — HEPARIN SODIUM SCH UNITS: 5000 INJECTION INTRAVENOUS; SUBCUTANEOUS at 13:12

## 2018-05-13 RX ADMIN — INSULIN LISPRO SCH UNITS: 100 INJECTION, SOLUTION INTRAVENOUS; SUBCUTANEOUS at 13:11

## 2018-05-13 RX ADMIN — ATORVASTATIN CALCIUM SCH MG: 10 TABLET, FILM COATED ORAL at 07:51

## 2018-05-13 RX ADMIN — CALCITRIOL SCH MCG: 0.25 CAPSULE ORAL at 07:50

## 2018-05-13 RX ADMIN — INSULIN LISPRO SCH UNITS: 100 INJECTION, SOLUTION INTRAVENOUS; SUBCUTANEOUS at 09:05

## 2018-05-13 RX ADMIN — POLYETHYLENE GLYCOL 3350 SCH: 17 POWDER, FOR SOLUTION ORAL at 09:05

## 2018-05-13 NOTE — DS
CC:  Dr. Motta *

 

DISCHARGE SUMMARY:

 

DATE OF ADMISSION:  05/07/18

 

DATE OF DISCHARGE:  05/13/18

 

PRIMARY CARE PROVIDER:  Dr. Alhaji Motta.

 

PRIMARY DIAGNOSIS:  Congestive heart failure exacerbation.

 

SECONDARY DIAGNOSES:  Include:

1.  Type 2 diabetes.

2.  Hypertension.

3.  Hyperlipidemia.

4.  Chronic obstructive pulmonary disease with chronic respiratory failure.

5.  Chronic kidney disease.

6.  Gout.

7.  Bradycardia.

 

MEDICATIONS ON DISCHARGE:  Include:

1.  Levothyroxine 75 mcg daily.

2.  Lantus 38 units twice daily.

3.  Metformin 1000 mg twice daily.

4.  Calcitriol 0.25 mcg daily.

5.  Lisinopril 20 mg daily.

6.  Allopurinol 100 mg daily.

7.  Acetaminophen 975 mg 3 times a day.

8.  Potassium chloride 10 mEq daily.

9.  Imodium 2 mg every 4 hours as needed.

10.  Gabapentin 600 mg 2 times a day.

11.  Tramadol 50 mg 3 times a day as needed.

12.  Simvastatin 20 mg daily.

13.  Lasix 40 mg daily; please note increased from 20 mg daily.

14.  Chlorthalidone 25 mg daily; please note separation from atenolol and 
discontinuation of atenolol.

15.  Amlodipine 10 mg daily; please note increased dose from 5 mg to 10 mg.

 

IMAGING PERFORMED DURING HOSPITAL STAY:  Includes lung V/Q scan, impression:  
Low probability for acute PE.

 

PERTINENT LABORATORY DATA:  Notable for BNP of 2006 on presentation.

 

HISTORY OF PRESENT ILLNESS AND HOSPITAL COURSE:  This is a 77-year-old female 
with past medical history as outlined in history of present illness on the day 
of admission including type 2 diabetes, hypertension, obesity, COPD with 
chronic respiratory failure, and CKD, presented to the hospital with increasing 
shortness of breath especially dyspnea on exertion.  She was admitted to the 
hospital with presumptive diagnosis of combined systolic and diastolic heart 
failure exacerbation, last echo was 05/07/18, and she was started on diuresis 
with IV Lasix with improvement in respiratory status.  Of note, she did have 
bradycardia and her atenolol was held during the course of her hospital stay.  
The patient would not like to pursue any interventions for bradycardia 
including interventions like permanent pacemaker.  The patient's Lantus was 
decreased to 10 units, but she was maintained with the sliding scale.  She will 
be discharged on lower dose Lantus of 20 units but with metformin restarted, 
please follow and adjust as necessary after discharge.  On the day of discharge
, she ambulated with 2 L of oxygen without oxygen saturation remaining at 93%.  
There were no complications during the course of this hospital stay.  Of note, 
her hemoglobin A1c was 5.7 on this hospital stay.

 

FOLLOWUP INSTRUCTIONS:

1.  Follow blood pressure, adjust medications as necessary.

2.  Follow heart rate.  Further conversation depending on the patient's goals 
of care.

3.  Follow blood sugar, adjust as necessary.  There has been a significant 
decrement in her need for long-acting insulin.

4.  Follow electrolytes and kidney function on higher dose Lasix.  Adjust Lasix 
to maintain euvolemia.

 

Reasons to return to the hospital include, but are not limited to, recurrent or 
worsening symptoms, chest pain, shortness of breath, nausea, vomiting, 
lightheadedness, loss of consciousness, bleeding from any source, inability to 
obtain or tolerate medication discussed with the patient, she acknowledged 
understanding.

 

TIME SPENT:  Greater than 45 minutes was spent on the discharge of this patient
, greater than half was spent face-to-face with the patient.

 

 [id number] #: [id number]

JOSLYN